# Patient Record
Sex: FEMALE | Race: WHITE | Employment: PART TIME | URBAN - METROPOLITAN AREA
[De-identification: names, ages, dates, MRNs, and addresses within clinical notes are randomized per-mention and may not be internally consistent; named-entity substitution may affect disease eponyms.]

---

## 2018-04-30 ENCOUNTER — OFFICE VISIT (OUTPATIENT)
Dept: ORTHOPEDIC SURGERY | Age: 48
End: 2018-04-30

## 2018-04-30 VITALS
WEIGHT: 250 LBS | HEIGHT: 62 IN | DIASTOLIC BLOOD PRESSURE: 96 MMHG | BODY MASS INDEX: 46.01 KG/M2 | SYSTOLIC BLOOD PRESSURE: 132 MMHG | HEART RATE: 99 BPM

## 2018-04-30 DIAGNOSIS — M79.605 LEFT LEG PAIN: Primary | ICD-10-CM

## 2018-04-30 DIAGNOSIS — M19.172 POST-TRAUMATIC ARTHRITIS OF ANKLE, LEFT: ICD-10-CM

## 2018-04-30 PROCEDURE — G8427 DOCREV CUR MEDS BY ELIG CLIN: HCPCS | Performed by: ORTHOPAEDIC SURGERY

## 2018-04-30 PROCEDURE — 1036F TOBACCO NON-USER: CPT | Performed by: ORTHOPAEDIC SURGERY

## 2018-04-30 PROCEDURE — 99214 OFFICE O/P EST MOD 30 MIN: CPT | Performed by: ORTHOPAEDIC SURGERY

## 2018-04-30 PROCEDURE — G8417 CALC BMI ABV UP PARAM F/U: HCPCS | Performed by: ORTHOPAEDIC SURGERY

## 2018-05-07 ENCOUNTER — HOSPITAL ENCOUNTER (OUTPATIENT)
Dept: MRI IMAGING | Age: 48
Discharge: HOME | End: 2018-05-07
Attending: ORTHOPAEDIC SURGERY | Admitting: ORTHOPAEDIC SURGERY

## 2018-05-07 DIAGNOSIS — M19.172 POST-TRAUMATIC OSTEOARTHRITIS, LEFT ANKLE AND FOOT: ICD-10-CM

## 2018-05-07 DIAGNOSIS — M19.172 POST-TRAUMATIC ARTHRITIS OF ANKLE, LEFT: ICD-10-CM

## 2018-05-10 ENCOUNTER — OFFICE VISIT (OUTPATIENT)
Dept: ORTHOPEDIC SURGERY | Age: 48
End: 2018-05-10

## 2018-05-10 VITALS
WEIGHT: 250 LBS | HEIGHT: 62 IN | BODY MASS INDEX: 46.01 KG/M2 | SYSTOLIC BLOOD PRESSURE: 143 MMHG | DIASTOLIC BLOOD PRESSURE: 90 MMHG | HEART RATE: 112 BPM

## 2018-05-10 DIAGNOSIS — M19.172 POST-TRAUMATIC ARTHRITIS OF ANKLE, LEFT: ICD-10-CM

## 2018-05-10 DIAGNOSIS — M19.072 ARTHRITIS OF LEFT ANKLE: Primary | ICD-10-CM

## 2018-05-10 DIAGNOSIS — M72.2 PLANTAR FASCIITIS OF LEFT FOOT: ICD-10-CM

## 2018-05-10 PROCEDURE — 99213 OFFICE O/P EST LOW 20 MIN: CPT | Performed by: PHYSICIAN ASSISTANT

## 2018-05-10 PROCEDURE — 1036F TOBACCO NON-USER: CPT | Performed by: PHYSICIAN ASSISTANT

## 2018-05-10 PROCEDURE — L1902 AFO ANKLE GAUNTLET PRE OTS: HCPCS | Performed by: PHYSICIAN ASSISTANT

## 2018-05-10 PROCEDURE — G8417 CALC BMI ABV UP PARAM F/U: HCPCS | Performed by: PHYSICIAN ASSISTANT

## 2018-05-10 PROCEDURE — G8427 DOCREV CUR MEDS BY ELIG CLIN: HCPCS | Performed by: PHYSICIAN ASSISTANT

## 2018-05-10 RX ORDER — DICLOFENAC SODIUM 75 MG/1
75 TABLET, DELAYED RELEASE ORAL 2 TIMES DAILY WITH MEALS
Qty: 40 TABLET | Refills: 0 | Status: SHIPPED | OUTPATIENT
Start: 2018-05-10 | End: 2018-06-01 | Stop reason: SDUPTHER

## 2018-06-01 ENCOUNTER — OFFICE VISIT (OUTPATIENT)
Dept: ORTHOPEDIC SURGERY | Age: 48
End: 2018-06-01

## 2018-06-01 VITALS
HEIGHT: 62 IN | HEART RATE: 72 BPM | WEIGHT: 250 LBS | DIASTOLIC BLOOD PRESSURE: 65 MMHG | BODY MASS INDEX: 46.01 KG/M2 | SYSTOLIC BLOOD PRESSURE: 96 MMHG

## 2018-06-01 DIAGNOSIS — M19.172 POST-TRAUMATIC ARTHRITIS OF LEFT ANKLE: Primary | ICD-10-CM

## 2018-06-01 PROCEDURE — 99214 OFFICE O/P EST MOD 30 MIN: CPT | Performed by: ORTHOPAEDIC SURGERY

## 2018-06-01 PROCEDURE — G8427 DOCREV CUR MEDS BY ELIG CLIN: HCPCS | Performed by: ORTHOPAEDIC SURGERY

## 2018-06-01 PROCEDURE — G8417 CALC BMI ABV UP PARAM F/U: HCPCS | Performed by: ORTHOPAEDIC SURGERY

## 2018-06-01 PROCEDURE — 1036F TOBACCO NON-USER: CPT | Performed by: ORTHOPAEDIC SURGERY

## 2018-06-01 RX ORDER — MELOXICAM 15 MG/1
15 TABLET ORAL DAILY
Qty: 30 TABLET | Refills: 0 | Status: SHIPPED | OUTPATIENT
Start: 2018-06-01 | End: 2018-06-27 | Stop reason: SDUPTHER

## 2018-06-01 RX ORDER — DICLOFENAC SODIUM 75 MG/1
TABLET, DELAYED RELEASE ORAL
Qty: 40 TABLET | Refills: 0 | Status: SHIPPED | OUTPATIENT
Start: 2018-06-01 | End: 2019-07-29

## 2018-06-27 DIAGNOSIS — M19.172 POST-TRAUMATIC ARTHRITIS OF LEFT ANKLE: ICD-10-CM

## 2018-06-27 RX ORDER — MELOXICAM 15 MG/1
TABLET ORAL
Qty: 30 TABLET | Refills: 5 | Status: SHIPPED | OUTPATIENT
Start: 2018-06-27 | End: 2019-07-29

## 2018-07-17 ENCOUNTER — OFFICE VISIT (OUTPATIENT)
Dept: ORTHOPEDIC SURGERY | Age: 48
End: 2018-07-17

## 2018-07-17 VITALS
HEIGHT: 61 IN | SYSTOLIC BLOOD PRESSURE: 134 MMHG | BODY MASS INDEX: 47.2 KG/M2 | WEIGHT: 250 LBS | DIASTOLIC BLOOD PRESSURE: 87 MMHG | HEART RATE: 90 BPM

## 2018-07-17 DIAGNOSIS — M25.572 LEFT ANKLE PAIN, UNSPECIFIED CHRONICITY: Primary | ICD-10-CM

## 2018-07-17 PROCEDURE — 20605 DRAIN/INJ JOINT/BURSA W/O US: CPT | Performed by: ORTHOPAEDIC SURGERY

## 2018-07-17 PROCEDURE — 99212 OFFICE O/P EST SF 10 MIN: CPT | Performed by: ORTHOPAEDIC SURGERY

## 2018-07-17 PROCEDURE — 1036F TOBACCO NON-USER: CPT | Performed by: ORTHOPAEDIC SURGERY

## 2018-07-17 PROCEDURE — G8427 DOCREV CUR MEDS BY ELIG CLIN: HCPCS | Performed by: ORTHOPAEDIC SURGERY

## 2018-07-17 PROCEDURE — G8417 CALC BMI ABV UP PARAM F/U: HCPCS | Performed by: ORTHOPAEDIC SURGERY

## 2018-07-23 ENCOUNTER — APPOINTMENT (OUTPATIENT)
Dept: GENERAL RADIOLOGY | Age: 48
End: 2018-07-23
Payer: MEDICARE

## 2018-07-23 ENCOUNTER — HOSPITAL ENCOUNTER (EMERGENCY)
Age: 48
Discharge: HOME | End: 2018-07-23
Attending: EMERGENCY MEDICINE
Payer: MEDICARE

## 2018-07-23 VITALS
HEART RATE: 100 BPM | HEIGHT: 62 IN | DIASTOLIC BLOOD PRESSURE: 107 MMHG | OXYGEN SATURATION: 97 % | WEIGHT: 280 LBS | SYSTOLIC BLOOD PRESSURE: 161 MMHG | BODY MASS INDEX: 51.53 KG/M2 | TEMPERATURE: 98.2 F | RESPIRATION RATE: 16 BRPM

## 2018-07-23 DIAGNOSIS — M79.601 RIGHT ARM PAIN: Primary | ICD-10-CM

## 2018-07-23 PROCEDURE — 99283 EMERGENCY DEPT VISIT LOW MDM: CPT

## 2018-07-23 PROCEDURE — 73060 X-RAY EXAM OF HUMERUS: CPT

## 2018-07-23 ASSESSMENT — PAIN SCALES - GENERAL: PAINLEVEL_OUTOF10: 2

## 2018-07-24 NOTE — ED PROVIDER NOTES
lungs every 6 hours as needed for Wheezing    BUSPIRONE (BUSPAR) 10 MG TABLET    Take 2 tablets by mouth 3 times daily. CALCIUM CARBONATE (OSCAL) 500 MG TABS TABLET    Take 500 mg by mouth daily. CLONAZEPAM (KLONOPIN) 0.5 MG TABLET    Take 1 tablet by mouth 3 times daily. q4-6 hours prn throughout the day    DICLOFENAC (VOLTAREN) 75 MG EC TABLET    TAKE 1 TABLET BY MOUTH TWICE A DAY WITH FOOD    FERROUS SULFATE 325 (65 FE) MG TABLET    Take 325 mg by mouth 2 times daily. FISH OIL-OMEGA-3 FATTY ACIDS 1000 MG CAPSULE    Take 1 g by mouth nightly. MELOXICAM (MOBIC) 15 MG TABLET    TAKE 1 TABLET BY MOUTH EVERY DAY    NAPROXEN (NAPROSYN) 375 MG TABLET    Take 1 tablet by mouth 2 times daily for 10 days    NIACIN 500 MG CR CAPSULE    Take 500 mg by mouth nightly. OMEPRAZOLE (PRILOSEC) 20 MG CAPSULE    Take 1 capsule by mouth daily. POTASSIUM CHLORIDE RAJANI CR (KLOR-CON M20 PO)    Take 1 tablet by mouth 2 times daily. TRIAMTERENE-HYDROCHLOROTHIAZIDE (MAXZIDE-25) 37.5-25 MG PER TABLET    Take 1 tablet by mouth daily. VENLAFAXINE (EFFEXOR) 100 MG TABLET    Take 4.5 tablets by mouth daily. ALLERGIES     Patient has no known allergies.     FAMILY HISTORY       Family History   Problem Relation Age of Onset    High Blood Pressure Mother     Heart Disease Father     High Blood Pressure Father           SOCIAL HISTORY       Social History     Social History    Marital status:      Spouse name: N/A    Number of children: N/A    Years of education: N/A     Social History Main Topics    Smoking status: Never Smoker    Smokeless tobacco: Never Used    Alcohol use No    Drug use: No    Sexual activity: Not Asked     Other Topics Concern    None     Social History Narrative    None       SCREENINGS             PHYSICAL EXAM    (up to 7 for level 4, 8 or more for level 5)     ED Triage Vitals [07/23/18 2153]   BP Temp Temp Source Pulse Resp SpO2 Height Weight   (!) 161/107 98.2 °F (36.8 °C) Oral 100 16 97 % 5' 2\" (1.575 m) 280 lb (127 kg)       Physical Exam      General Appearance:  Alert, cooperative, no distress, appears stated age. Head:  Normocephalic, without obvious abnormality, atraumatic. Eyes:  conjunctiva/corneas clear, EOM's intact. Sclera anicteric. ENT: Mucous membranes moist.   Neck: Supple, symmetrical, trachea midline, no adenopathy. No jugular venous distention. Lungs:   Clear to auscultation bilaterally, respirations unlabored. No rales, rhonchi or wheezes. Chest Wall:  No tenderness. Heart:  Regular rate and rhythm, S1 and S2 normal, no murmur, rub or gallop. Abdomen:   Soft, non-tender, bowel sounds active,   no masses, no organomegaly. Extremities: No bruising or ecchymosis to the right upper arm mild tenderness on palpation no crepitus no pain or tenderness in the apical before meals joint shoulder elbow forearm wrist and hand   Pulses: 2+ and symmetric   Skin: Turgor is normal, no rashes or lesions. Neurologic: Alert and oriented X 3. No focal findings. Motor grossly normal.  Speech clear, no drift, CN III-XII grossly intact,        DIAGNOSTIC RESULTS   LABS:    Labs Reviewed - No data to display    All other labs were within normal range or not returned as of this dictation. EKG: All EKG's are interpreted by the Emergency Department Physician who either signs or Co-signs this chart in the absence of a cardiologist.        RADIOLOGY:   Non-plain film images such as CT, Ultrasound and MRI are read by the radiologist. Plain radiographic images are visualized by myself. *    Interpretation per the Radiologist below, if available at the time of this note:    XR HUMERUS RIGHT (MIN 2 VIEWS)   Final Result   No fracture or malalignment.                PROCEDURES   Unless otherwise noted below, none     Procedures    *    CRITICAL CARE TIME   N/A      EMERGENCY DEPARTMENT COURSE and DIFFERENTIAL DIAGNOSIS/MDM:   Vitals:    Vitals: 07/23/18 2153   BP: (!) 161/107   Pulse: 100   Resp: 16   Temp: 98.2 °F (36.8 °C)   TempSrc: Oral   SpO2: 97%   Weight: 280 lb (127 kg)   Height: 5' 2\" (1.575 m)       Patient was given the following medications:  Medications - No data to display        The patient tolerated their visit well. The patient and / or the family were informed of the results of any tests, a time was given to answer questions. FINAL IMPRESSION      1. Right arm pain          DISPOSITION/PLAN   DISPOSITION Decision To Discharge 07/23/2018 10:44:05 PM      PATIENT REFERRED TO:  Dinesh Handy  [address] [address]  [phone]    In 2 days  As needed      DISCHARGE MEDICATIONS:  New Prescriptions    No medications on file       DISCONTINUED MEDICATIONS:  Discontinued Medications    No medications on file              (Please note that portions of this note were completed with a voice recognition program.  Efforts were made to edit the dictations but occasionally words are mis-transcribed.)    Jessica Rehman MD (electronically signed)      Jessica Rehman MD  07/23/18 [phone]

## 2019-01-17 ENCOUNTER — OFFICE VISIT (OUTPATIENT)
Dept: ORTHOPEDIC SURGERY | Age: 49
End: 2019-01-17
Payer: MEDICARE

## 2019-01-17 VITALS — HEIGHT: 62 IN | BODY MASS INDEX: 51.52 KG/M2 | WEIGHT: 279.98 LBS

## 2019-01-17 DIAGNOSIS — S83.241A TEAR OF MEDIAL MENISCUS OF RIGHT KNEE, UNSPECIFIED TEAR TYPE, UNSPECIFIED WHETHER OLD OR CURRENT TEAR, INITIAL ENCOUNTER: ICD-10-CM

## 2019-01-17 DIAGNOSIS — M17.11 PATELLOFEMORAL ARTHRITIS OF RIGHT KNEE: ICD-10-CM

## 2019-01-17 DIAGNOSIS — M25.561 RIGHT KNEE PAIN, UNSPECIFIED CHRONICITY: Primary | ICD-10-CM

## 2019-01-17 PROCEDURE — 99213 OFFICE O/P EST LOW 20 MIN: CPT | Performed by: ORTHOPAEDIC SURGERY

## 2019-01-17 PROCEDURE — 20610 DRAIN/INJ JOINT/BURSA W/O US: CPT | Performed by: ORTHOPAEDIC SURGERY

## 2019-02-11 ENCOUNTER — OFFICE VISIT (OUTPATIENT)
Dept: ORTHOPEDIC SURGERY | Age: 49
End: 2019-02-11
Payer: MEDICARE

## 2019-02-11 VITALS — BODY MASS INDEX: 51.52 KG/M2 | HEIGHT: 62 IN | WEIGHT: 279.98 LBS

## 2019-02-11 DIAGNOSIS — M25.561 RIGHT KNEE PAIN, UNSPECIFIED CHRONICITY: Primary | ICD-10-CM

## 2019-02-11 DIAGNOSIS — S83.241A TEAR OF MEDIAL MENISCUS OF RIGHT KNEE, UNSPECIFIED TEAR TYPE, UNSPECIFIED WHETHER OLD OR CURRENT TEAR, INITIAL ENCOUNTER: ICD-10-CM

## 2019-02-11 DIAGNOSIS — M17.11 PATELLOFEMORAL ARTHRITIS OF RIGHT KNEE: ICD-10-CM

## 2019-02-11 PROCEDURE — G8484 FLU IMMUNIZE NO ADMIN: HCPCS | Performed by: ORTHOPAEDIC SURGERY

## 2019-02-11 PROCEDURE — 1036F TOBACCO NON-USER: CPT | Performed by: ORTHOPAEDIC SURGERY

## 2019-02-11 PROCEDURE — 99214 OFFICE O/P EST MOD 30 MIN: CPT | Performed by: ORTHOPAEDIC SURGERY

## 2019-02-11 PROCEDURE — G8427 DOCREV CUR MEDS BY ELIG CLIN: HCPCS | Performed by: ORTHOPAEDIC SURGERY

## 2019-02-11 PROCEDURE — G8417 CALC BMI ABV UP PARAM F/U: HCPCS | Performed by: ORTHOPAEDIC SURGERY

## 2019-02-18 ENCOUNTER — OFFICE VISIT (OUTPATIENT)
Dept: ORTHOPEDIC SURGERY | Age: 49
End: 2019-02-18
Payer: MEDICARE

## 2019-02-18 DIAGNOSIS — M17.11 PRIMARY OSTEOARTHRITIS OF RIGHT KNEE: Primary | ICD-10-CM

## 2019-02-18 PROCEDURE — 99999 PR OFFICE/OUTPT VISIT,PROCEDURE ONLY: CPT | Performed by: PHYSICIAN ASSISTANT

## 2019-02-18 PROCEDURE — 20611 DRAIN/INJ JOINT/BURSA W/US: CPT | Performed by: PHYSICIAN ASSISTANT

## 2019-02-25 ENCOUNTER — OFFICE VISIT (OUTPATIENT)
Dept: ORTHOPEDIC SURGERY | Age: 49
End: 2019-02-25
Payer: MEDICARE

## 2019-02-25 DIAGNOSIS — M17.11 PRIMARY OSTEOARTHRITIS OF RIGHT KNEE: Primary | ICD-10-CM

## 2019-03-04 ENCOUNTER — OFFICE VISIT (OUTPATIENT)
Dept: ORTHOPEDIC SURGERY | Age: 49
End: 2019-03-04
Payer: MEDICARE

## 2019-03-04 DIAGNOSIS — M17.11 PRIMARY OSTEOARTHRITIS OF RIGHT KNEE: Primary | ICD-10-CM

## 2019-07-29 ENCOUNTER — HOSPITAL ENCOUNTER (EMERGENCY)
Age: 49
Discharge: HOME | End: 2019-07-29
Payer: MEDICARE

## 2019-07-29 VITALS
WEIGHT: 275 LBS | TEMPERATURE: 98.7 F | OXYGEN SATURATION: 98 % | SYSTOLIC BLOOD PRESSURE: 125 MMHG | DIASTOLIC BLOOD PRESSURE: 98 MMHG | HEART RATE: 78 BPM | RESPIRATION RATE: 17 BRPM | HEIGHT: 62 IN | BODY MASS INDEX: 50.61 KG/M2

## 2019-07-29 DIAGNOSIS — F43.9 STRESS: ICD-10-CM

## 2019-07-29 DIAGNOSIS — Z86.59 HISTORY OF PANIC ATTACKS: Primary | ICD-10-CM

## 2019-07-29 PROCEDURE — 6370000000 HC RX 637 (ALT 250 FOR IP): Performed by: NURSE PRACTITIONER

## 2019-07-29 PROCEDURE — 99282 EMERGENCY DEPT VISIT SF MDM: CPT

## 2019-07-29 RX ORDER — ACETAMINOPHEN 325 MG/1
650 TABLET ORAL ONCE
Status: COMPLETED | OUTPATIENT
Start: 2019-07-29 | End: 2019-07-29

## 2019-07-29 RX ADMIN — ACETAMINOPHEN 650 MG: 325 TABLET ORAL at 13:41

## 2019-07-29 ASSESSMENT — PAIN DESCRIPTION - LOCATION: LOCATION: HEAD

## 2019-07-29 ASSESSMENT — PAIN DESCRIPTION - DESCRIPTORS: DESCRIPTORS: ACHING

## 2019-07-29 ASSESSMENT — ENCOUNTER SYMPTOMS
ABDOMINAL PAIN: 0
SHORTNESS OF BREATH: 0

## 2019-07-29 ASSESSMENT — PAIN SCALES - GENERAL
PAINLEVEL_OUTOF10: 5
PAINLEVEL_OUTOF10: 5

## 2019-07-29 ASSESSMENT — PAIN DESCRIPTION - PAIN TYPE: TYPE: ACUTE PAIN

## 2019-07-29 NOTE — ED PROVIDER NOTES
Evaluated by [id number] [facility] Provider          [facility] ED  eMERGENCY dEPARTMENT eNCOUnter        Pt Name: Amber Arias  MRN: [id number]  [place] 1970  Dateof evaluation: 7/29/2019  Provider: ROBER Abreu CNP  PCP: Yoana Barragan  ED Attending: No att. providers found    [facility]       Chief Complaint   Patient presents with    Panic Attack     pt states she was at work today and had a panic attack. pt states she had a situation that made her very upset and she started crying and had rapid breathing. pt c/o headache. pt calm and breathing easy at this time. HISTORY OF PRESENTILLNESS   (Location/Symptom, Timing/Onset, Context/Setting, Quality, Duration, Modifying Factors, Severity)  Note limiting factors. Amber Arias is a 47 y.o. female for concern for a panic attack. Onset was today. Duration has been since the onset. Context includes pt states she had a panic attack this am and now has a headache. Alleviating factors include nothing. Aggravating factors include nothing. Pain is 5/10. nothing has been used for pain today. Nursing Notes were all reviewed and agreed with or any disagreements were addressed  in the HPI. REVIEW OF SYSTEMS    (2-9 systems for level 4, 10 or more for level 5)     Review of Systems   Constitutional: Negative for fever. Respiratory: Negative for shortness of breath. Cardiovascular: Negative for chest pain. Gastrointestinal: Negative for abdominal pain. Genitourinary: Negative for difficulty urinating. Neurological: Positive for headaches. All other systems reviewed and are negative. Positives and Pertinent negatives as per HPI. Except as noted above in the ROS, all other systems were reviewed and negative.        PAST MEDICAL HISTORY     Past Medical History:   Diagnosis Date    Anxiety     severe    GERD (gastroesophageal reflux disease)     Hiatal hernia     Hypertension     Nausea & vomiting

## 2019-09-30 ENCOUNTER — APPOINTMENT (OUTPATIENT)
Dept: GENERAL RADIOLOGY | Age: 49
End: 2019-09-30
Payer: MEDICARE

## 2019-09-30 ENCOUNTER — HOSPITAL ENCOUNTER (EMERGENCY)
Age: 49
Discharge: HOME | End: 2019-09-30
Attending: EMERGENCY MEDICINE
Payer: MEDICARE

## 2019-09-30 ENCOUNTER — APPOINTMENT (OUTPATIENT)
Dept: CT IMAGING | Age: 49
End: 2019-09-30
Payer: MEDICARE

## 2019-09-30 VITALS
TEMPERATURE: 98 F | DIASTOLIC BLOOD PRESSURE: 100 MMHG | RESPIRATION RATE: 16 BRPM | OXYGEN SATURATION: 93 % | HEART RATE: 92 BPM | SYSTOLIC BLOOD PRESSURE: 144 MMHG | HEIGHT: 68 IN | BODY MASS INDEX: 42.44 KG/M2 | WEIGHT: 280 LBS
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
## 2019-09-30 LAB
A/G RATIO: 1 (ref 1.1–2.2)
ALBUMIN SERPL-MCNC: 3.7 G/DL (ref 3.4–5)
ALP BLD-CCNC: 82 U/L (ref 40–129)
ALT SERPL-CCNC: 7 U/L (ref 10–40)
ANION GAP SERPL CALCULATED.3IONS-SCNC: 11 MMOL/L (ref 3–16)
AST SERPL-CCNC: 11 U/L (ref 15–37)
BASOPHILS ABSOLUTE: 0.1 K/UL (ref 0–0.2)
BASOPHILS RELATIVE PERCENT: 0.7 %
BILIRUB SERPL-MCNC: 0.3 MG/DL (ref 0–1)
BUN BLDV-MCNC: 19 MG/DL (ref 7–20)
CALCIUM SERPL-MCNC: 9.4 MG/DL (ref 8.3–10.6)
CHLORIDE BLD-SCNC: 101 MMOL/L (ref 99–110)
CO2: 26 MMOL/L (ref 21–32)
CREAT SERPL-MCNC: 1.2 MG/DL (ref 0.6–1.1)
EOSINOPHILS ABSOLUTE: 0.2 K/UL (ref 0–0.6)
EOSINOPHILS RELATIVE PERCENT: 2.7 %
GFR AFRICAN AMERICAN: 57
GFR NON-AFRICAN AMERICAN: 47
GLOBULIN: 3.6 G/DL
GLUCOSE BLD-MCNC: 99 MG/DL (ref 70–99)
HCG QUALITATIVE: NEGATIVE
HCT VFR BLD CALC: 43 % (ref 36–48)
HEMOGLOBIN: 14.1 G/DL (ref 12–16)
LYMPHOCYTES ABSOLUTE: 1.4 K/UL (ref 1–5.1)
LYMPHOCYTES RELATIVE PERCENT: 17.3 %
MCH RBC QN AUTO: 28.6 PG (ref 26–34)
MCHC RBC AUTO-ENTMCNC: 32.7 G/DL (ref 31–36)
MCV RBC AUTO: 87.4 FL (ref 80–100)
MONOCYTES ABSOLUTE: 0.8 K/UL (ref 0–1.3)
MONOCYTES RELATIVE PERCENT: 9.6 %
NEUTROPHILS ABSOLUTE: 5.7 K/UL (ref 1.7–7.7)
NEUTROPHILS RELATIVE PERCENT: 69.7 %
PDW BLD-RTO: 13.8 % (ref 12.4–15.4)
PLATELET # BLD: 230 K/UL (ref 135–450)
PMV BLD AUTO: 7.3 FL (ref 5–10.5)
POTASSIUM REFLEX MAGNESIUM: 4.1 MMOL/L (ref 3.5–5.1)
RBC # BLD: 4.92 M/UL (ref 4–5.2)
SODIUM BLD-SCNC: 138 MMOL/L (ref 136–145)
TOTAL PROTEIN: 7.3 G/DL (ref 6.4–8.2)
WBC # BLD: 8.2 K/UL (ref 4–11)

## 2019-09-30 PROCEDURE — 6360000002 HC RX W HCPCS: Performed by: EMERGENCY MEDICINE

## 2019-09-30 PROCEDURE — 73562 X-RAY EXAM OF KNEE 3: CPT

## 2019-09-30 PROCEDURE — 84703 CHORIONIC GONADOTROPIN ASSAY: CPT

## 2019-09-30 PROCEDURE — 85025 COMPLETE CBC W/AUTO DIFF WBC: CPT

## 2019-09-30 PROCEDURE — 90471 IMMUNIZATION ADMIN: CPT | Performed by: EMERGENCY MEDICINE

## 2019-09-30 PROCEDURE — 70450 CT HEAD/BRAIN W/O DYE: CPT

## 2019-09-30 PROCEDURE — 73590 X-RAY EXAM OF LOWER LEG: CPT

## 2019-09-30 PROCEDURE — 80053 COMPREHEN METABOLIC PANEL: CPT

## 2019-09-30 PROCEDURE — 99284 EMERGENCY DEPT VISIT MOD MDM: CPT

## 2019-09-30 PROCEDURE — 90715 TDAP VACCINE 7 YRS/> IM: CPT | Performed by: EMERGENCY MEDICINE

## 2019-09-30 RX ADMIN — TETANUS TOXOID, REDUCED DIPHTHERIA TOXOID AND ACELLULAR PERTUSSIS VACCINE, ADSORBED 0.5 ML: 5; 2.5; 8; 8; 2.5 SUSPENSION INTRAMUSCULAR at 13:04

## 2019-09-30 ASSESSMENT — PAIN DESCRIPTION - LOCATION: LOCATION: HEAD

## 2019-09-30 ASSESSMENT — PAIN DESCRIPTION - PAIN TYPE: TYPE: ACUTE PAIN

## 2019-09-30 ASSESSMENT — PAIN SCALES - GENERAL: PAINLEVEL_OUTOF10: 4

## 2019-10-02 ENCOUNTER — HOSPITAL ENCOUNTER (EMERGENCY)
Age: 49
Discharge: HOME | End: 2019-10-02
Attending: EMERGENCY MEDICINE
Payer: MEDICARE

## 2019-10-02 VITALS
OXYGEN SATURATION: 97 % | TEMPERATURE: 97.6 F | WEIGHT: 280 LBS | HEART RATE: 94 BPM | SYSTOLIC BLOOD PRESSURE: 141 MMHG | BODY MASS INDEX: 42.44 KG/M2 | HEIGHT: 68 IN | DIASTOLIC BLOOD PRESSURE: 104 MMHG | RESPIRATION RATE: 16 BRPM

## 2019-10-02 DIAGNOSIS — R45.851 SUICIDAL THOUGHTS: ICD-10-CM

## 2019-10-02 DIAGNOSIS — Z86.59 HX OF ANXIETY DISORDER: ICD-10-CM

## 2019-10-02 DIAGNOSIS — I10 HYPERTENSION, UNSPECIFIED TYPE: ICD-10-CM

## 2019-10-02 DIAGNOSIS — Z00.8 ENCOUNTER FOR PSYCHOLOGICAL EVALUATION: Primary | ICD-10-CM

## 2019-10-02 LAB
A/G RATIO: 1.1 (ref 1.1–2.2)
ACETAMINOPHEN LEVEL: <5 UG/ML (ref 10–30)
ALBUMIN SERPL-MCNC: 4.3 G/DL (ref 3.4–5)
ALP BLD-CCNC: 93 U/L (ref 40–129)
ALT SERPL-CCNC: <5 U/L (ref 10–40)
AMPHETAMINE SCREEN, URINE: POSITIVE
ANION GAP SERPL CALCULATED.3IONS-SCNC: 13 MMOL/L (ref 3–16)
AST SERPL-CCNC: 12 U/L (ref 15–37)
BARBITURATE SCREEN URINE: ABNORMAL
BASOPHILS ABSOLUTE: 0.1 K/UL (ref 0–0.2)
BASOPHILS RELATIVE PERCENT: 0.8 %
BENZODIAZEPINE SCREEN, URINE: ABNORMAL
BILIRUB SERPL-MCNC: 0.3 MG/DL (ref 0–1)
BILIRUBIN URINE: NEGATIVE
BLOOD, URINE: ABNORMAL
BUN BLDV-MCNC: 22 MG/DL (ref 7–20)
CALCIUM SERPL-MCNC: 9.7 MG/DL (ref 8.3–10.6)
CANNABINOID SCREEN URINE: ABNORMAL
CHLORIDE BLD-SCNC: 100 MMOL/L (ref 99–110)
CLARITY: ABNORMAL
CO2: 24 MMOL/L (ref 21–32)
COCAINE METABOLITE SCREEN URINE: ABNORMAL
COLOR: YELLOW
CREAT SERPL-MCNC: 1.3 MG/DL (ref 0.6–1.1)
EOSINOPHILS ABSOLUTE: 0.3 K/UL (ref 0–0.6)
EOSINOPHILS RELATIVE PERCENT: 3 %
EPITHELIAL CELLS, UA: ABNORMAL /HPF
ETHANOL: 36 MG/DL (ref 0–0.08)
GFR AFRICAN AMERICAN: 52
GFR NON-AFRICAN AMERICAN: 43
GLOBULIN: 3.8 G/DL
GLUCOSE BLD-MCNC: 114 MG/DL (ref 70–99)
GLUCOSE URINE: NEGATIVE MG/DL
HCT VFR BLD CALC: 47.1 % (ref 36–48)
HEMOGLOBIN: 15.6 G/DL (ref 12–16)
KETONES, URINE: NEGATIVE MG/DL
LEUKOCYTE ESTERASE, URINE: NEGATIVE
LYMPHOCYTES ABSOLUTE: 1.9 K/UL (ref 1–5.1)
LYMPHOCYTES RELATIVE PERCENT: 18.3 %
Lab: ABNORMAL
MCH RBC QN AUTO: 28.7 PG (ref 26–34)
MCHC RBC AUTO-ENTMCNC: 33.1 G/DL (ref 31–36)
MCV RBC AUTO: 86.8 FL (ref 80–100)
METHADONE SCREEN, URINE: ABNORMAL
MICROSCOPIC EXAMINATION: YES
MONOCYTES ABSOLUTE: 0.8 K/UL (ref 0–1.3)
MONOCYTES RELATIVE PERCENT: 7.9 %
NEUTROPHILS ABSOLUTE: 7.3 K/UL (ref 1.7–7.7)
NEUTROPHILS RELATIVE PERCENT: 70 %
NITRITE, URINE: NEGATIVE
OPIATE SCREEN URINE: ABNORMAL
OXYCODONE URINE: ABNORMAL
PDW BLD-RTO: 13.9 % (ref 12.4–15.4)
PH UA: 5.5
PH UA: 5.5 (ref 5–8)
PHENCYCLIDINE SCREEN URINE: POSITIVE
PLATELET # BLD: 264 K/UL (ref 135–450)
PMV BLD AUTO: 7.2 FL (ref 5–10.5)
POTASSIUM SERPL-SCNC: 3.8 MMOL/L (ref 3.5–5.1)
PROPOXYPHENE SCREEN: ABNORMAL
PROTEIN UA: NEGATIVE MG/DL
RBC # BLD: 5.42 M/UL (ref 4–5.2)
RBC UA: ABNORMAL /HPF (ref 0–2)
SALICYLATE, SERUM: <0.3 MG/DL (ref 15–30)
SODIUM BLD-SCNC: 137 MMOL/L (ref 136–145)
SPECIFIC GRAVITY UA: >=1.03 (ref 1–1.03)
TOTAL PROTEIN: 8.1 G/DL (ref 6.4–8.2)
URINE REFLEX TO CULTURE: ABNORMAL
URINE TYPE: ABNORMAL
UROBILINOGEN, URINE: 0.2 E.U./DL
WBC # BLD: 10.5 K/UL (ref 4–11)
WBC UA: ABNORMAL /HPF (ref 0–5)

## 2019-10-02 PROCEDURE — G0480 DRUG TEST DEF 1-7 CLASSES: HCPCS

## 2019-10-02 PROCEDURE — 80053 COMPREHEN METABOLIC PANEL: CPT

## 2019-10-02 PROCEDURE — 81001 URINALYSIS AUTO W/SCOPE: CPT

## 2019-10-02 PROCEDURE — 85025 COMPLETE CBC W/AUTO DIFF WBC: CPT

## 2019-10-02 PROCEDURE — 99284 EMERGENCY DEPT VISIT MOD MDM: CPT

## 2019-10-02 PROCEDURE — 80307 DRUG TEST PRSMV CHEM ANLYZR: CPT

## 2019-11-10 ENCOUNTER — HOSPITAL ENCOUNTER (EMERGENCY)
Age: 49
Discharge: HOME | End: 2019-11-10
Attending: EMERGENCY MEDICINE
Payer: MEDICARE

## 2019-11-10 VITALS
HEART RATE: 98 BPM | TEMPERATURE: 98.3 F | OXYGEN SATURATION: 95 % | SYSTOLIC BLOOD PRESSURE: 178 MMHG | BODY MASS INDEX: 46.01 KG/M2 | RESPIRATION RATE: 18 BRPM | HEIGHT: 62 IN | DIASTOLIC BLOOD PRESSURE: 112 MMHG | WEIGHT: 250 LBS

## 2019-11-10 DIAGNOSIS — F19.10 POLYSUBSTANCE ABUSE (HCC): ICD-10-CM

## 2019-11-10 DIAGNOSIS — S40.022A TRAUMATIC ECCHYMOSIS OF LEFT UPPER ARM, INITIAL ENCOUNTER: ICD-10-CM

## 2019-11-10 DIAGNOSIS — Z72.89 DELIBERATE SELF-CUTTING: ICD-10-CM

## 2019-11-10 DIAGNOSIS — R03.0 ELEVATED BLOOD PRESSURE READING: ICD-10-CM

## 2019-11-10 DIAGNOSIS — F39 MOOD DISORDER (HCC): Primary | ICD-10-CM

## 2019-11-10 LAB
A/G RATIO: 1 (ref 1.1–2.2)
ACETAMINOPHEN LEVEL: <5 UG/ML (ref 10–30)
ALBUMIN SERPL-MCNC: 4 G/DL (ref 3.4–5)
ALP BLD-CCNC: 88 U/L (ref 40–129)
ALT SERPL-CCNC: 11 U/L (ref 10–40)
AMPHETAMINE SCREEN, URINE: POSITIVE
ANION GAP SERPL CALCULATED.3IONS-SCNC: 11 MMOL/L (ref 3–16)
AST SERPL-CCNC: 12 U/L (ref 15–37)
BARBITURATE SCREEN URINE: ABNORMAL
BASOPHILS ABSOLUTE: 0.1 K/UL (ref 0–0.2)
BASOPHILS RELATIVE PERCENT: 0.6 %
BENZODIAZEPINE SCREEN, URINE: POSITIVE
BILIRUB SERPL-MCNC: 0.3 MG/DL (ref 0–1)
BILIRUBIN URINE: NEGATIVE
BLOOD, URINE: NEGATIVE
BUN BLDV-MCNC: 16 MG/DL (ref 7–20)
CALCIUM SERPL-MCNC: 10 MG/DL (ref 8.3–10.6)
CANNABINOID SCREEN URINE: ABNORMAL
CHLORIDE BLD-SCNC: 97 MMOL/L (ref 99–110)
CLARITY: CLEAR
CO2: 28 MMOL/L (ref 21–32)
COCAINE METABOLITE SCREEN URINE: ABNORMAL
COLOR: YELLOW
CREAT SERPL-MCNC: 1.1 MG/DL (ref 0.6–1.1)
EOSINOPHILS ABSOLUTE: 0.3 K/UL (ref 0–0.6)
EOSINOPHILS RELATIVE PERCENT: 2.3 %
GFR AFRICAN AMERICAN: >60
GFR NON-AFRICAN AMERICAN: 52
GLOBULIN: 3.9 G/DL
GLUCOSE BLD-MCNC: 121 MG/DL (ref 70–99)
GLUCOSE URINE: NEGATIVE MG/DL
HCT VFR BLD CALC: 44.1 % (ref 36–48)
HEMOGLOBIN: 14.4 G/DL (ref 12–16)
KETONES, URINE: NEGATIVE MG/DL
LEUKOCYTE ESTERASE, URINE: NEGATIVE
LYMPHOCYTES ABSOLUTE: 2 K/UL (ref 1–5.1)
LYMPHOCYTES RELATIVE PERCENT: 17.9 %
Lab: ABNORMAL
MCH RBC QN AUTO: 28.5 PG (ref 26–34)
MCHC RBC AUTO-ENTMCNC: 32.6 G/DL (ref 31–36)
MCV RBC AUTO: 87.5 FL (ref 80–100)
METHADONE SCREEN, URINE: ABNORMAL
MICROSCOPIC EXAMINATION: NORMAL
MONOCYTES ABSOLUTE: 0.9 K/UL (ref 0–1.3)
MONOCYTES RELATIVE PERCENT: 8.2 %
NEUTROPHILS ABSOLUTE: 8.1 K/UL (ref 1.7–7.7)
NEUTROPHILS RELATIVE PERCENT: 71 %
NITRITE, URINE: NEGATIVE
OPIATE SCREEN URINE: ABNORMAL
OXYCODONE URINE: ABNORMAL
PDW BLD-RTO: 13.9 % (ref 12.4–15.4)
PH UA: 6
PH UA: 6 (ref 5–8)
PHENCYCLIDINE SCREEN URINE: POSITIVE
PLATELET # BLD: 265 K/UL (ref 135–450)
PMV BLD AUTO: 7.1 FL (ref 5–10.5)
POTASSIUM SERPL-SCNC: 4.1 MMOL/L (ref 3.5–5.1)
PROPOXYPHENE SCREEN: ABNORMAL
PROTEIN UA: NEGATIVE MG/DL
RBC # BLD: 5.04 M/UL (ref 4–5.2)
SALICYLATE, SERUM: <0.3 MG/DL (ref 15–30)
SODIUM BLD-SCNC: 136 MMOL/L (ref 136–145)
SPECIFIC GRAVITY UA: 1.02 (ref 1–1.03)
TOTAL PROTEIN: 7.9 G/DL (ref 6.4–8.2)
URINE REFLEX TO CULTURE: NORMAL
URINE TYPE: NORMAL
UROBILINOGEN, URINE: 0.2 E.U./DL
WBC # BLD: 11.4 K/UL (ref 4–11)

## 2019-11-10 PROCEDURE — 80307 DRUG TEST PRSMV CHEM ANLYZR: CPT

## 2019-11-10 PROCEDURE — 80053 COMPREHEN METABOLIC PANEL: CPT

## 2019-11-10 PROCEDURE — 85025 COMPLETE CBC W/AUTO DIFF WBC: CPT

## 2019-11-10 PROCEDURE — G0480 DRUG TEST DEF 1-7 CLASSES: HCPCS

## 2019-11-10 PROCEDURE — 81003 URINALYSIS AUTO W/O SCOPE: CPT

## 2019-11-10 PROCEDURE — 99284 EMERGENCY DEPT VISIT MOD MDM: CPT

## 2020-03-08 ENCOUNTER — HOSPITAL ENCOUNTER (EMERGENCY)
Age: 50
Discharge: HOME | End: 2020-03-09
Attending: EMERGENCY MEDICINE
Payer: MEDICARE

## 2020-03-08 PROCEDURE — 99284 EMERGENCY DEPT VISIT MOD MDM: CPT

## 2020-03-08 ASSESSMENT — PAIN DESCRIPTION - LOCATION: LOCATION: ABDOMEN

## 2020-03-09 VITALS
TEMPERATURE: 97.1 F | DIASTOLIC BLOOD PRESSURE: 107 MMHG | OXYGEN SATURATION: 98 % | RESPIRATION RATE: 18 BRPM | SYSTOLIC BLOOD PRESSURE: 148 MMHG | HEART RATE: 89 BPM | BODY MASS INDEX: 52.44 KG/M2 | WEIGHT: 285 LBS | HEIGHT: 62 IN
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
## 2020-03-11 ASSESSMENT — ENCOUNTER SYMPTOMS
ABDOMINAL PAIN: 0
VOMITING: 0
NAUSEA: 0
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
## 2021-09-15 ENCOUNTER — HOSPITAL ENCOUNTER (EMERGENCY)
Age: 51
Discharge: HOME | End: 2021-09-15
Attending: EMERGENCY MEDICINE
Payer: MEDICARE

## 2021-09-15 VITALS
HEART RATE: 89 BPM | SYSTOLIC BLOOD PRESSURE: 141 MMHG | TEMPERATURE: 98.1 F | RESPIRATION RATE: 18 BRPM | OXYGEN SATURATION: 96 % | DIASTOLIC BLOOD PRESSURE: 87 MMHG

## 2021-09-15 DIAGNOSIS — U07.1 COVID-19: ICD-10-CM

## 2021-09-15 DIAGNOSIS — F41.9 ANXIETY: ICD-10-CM

## 2021-09-15 DIAGNOSIS — R11.2 NON-INTRACTABLE VOMITING WITH NAUSEA, UNSPECIFIED VOMITING TYPE: Primary | ICD-10-CM

## 2021-09-15 LAB
ANION GAP SERPL CALCULATED.3IONS-SCNC: 13 MMOL/L (ref 3–16)
BASOPHILS ABSOLUTE: 0 K/UL (ref 0–0.2)
BASOPHILS RELATIVE PERCENT: 0.4 %
BILIRUBIN URINE: ABNORMAL
BLOOD, URINE: NEGATIVE
BUN BLDV-MCNC: 15 MG/DL (ref 7–20)
CALCIUM SERPL-MCNC: 8.6 MG/DL (ref 8.3–10.6)
CHLORIDE BLD-SCNC: 103 MMOL/L (ref 99–110)
CLARITY: CLEAR
CO2: 23 MMOL/L (ref 21–32)
COLOR: YELLOW
CREAT SERPL-MCNC: 1 MG/DL (ref 0.6–1.1)
EOSINOPHILS ABSOLUTE: 0.1 K/UL (ref 0–0.6)
EOSINOPHILS RELATIVE PERCENT: 1.6 %
GFR AFRICAN AMERICAN: >60
GFR NON-AFRICAN AMERICAN: 58
GLUCOSE BLD-MCNC: 107 MG/DL (ref 70–99)
GLUCOSE URINE: NEGATIVE MG/DL
HCT VFR BLD CALC: 44.2 % (ref 36–48)
HEMOGLOBIN: 14.9 G/DL (ref 12–16)
KETONES, URINE: NEGATIVE MG/DL
LEUKOCYTE ESTERASE, URINE: NEGATIVE
LYMPHOCYTES ABSOLUTE: 0.9 K/UL (ref 1–5.1)
LYMPHOCYTES RELATIVE PERCENT: 19.4 %
MAGNESIUM: 1.8 MG/DL (ref 1.8–2.4)
MCH RBC QN AUTO: 28.7 PG (ref 26–34)
MCHC RBC AUTO-ENTMCNC: 33.7 G/DL (ref 31–36)
MCV RBC AUTO: 85.1 FL (ref 80–100)
MICROSCOPIC EXAMINATION: ABNORMAL
MONOCYTES ABSOLUTE: 0.4 K/UL (ref 0–1.3)
MONOCYTES RELATIVE PERCENT: 9.8 %
NEUTROPHILS ABSOLUTE: 3.1 K/UL (ref 1.7–7.7)
NEUTROPHILS RELATIVE PERCENT: 68.8 %
NITRITE, URINE: NEGATIVE
PDW BLD-RTO: 14.5 % (ref 12.4–15.4)
PH UA: 6 (ref 5–8)
PLATELET # BLD: 133 K/UL (ref 135–450)
PMV BLD AUTO: 7.8 FL (ref 5–10.5)
POTASSIUM REFLEX MAGNESIUM: 3.4 MMOL/L (ref 3.5–5.1)
PROTEIN UA: NEGATIVE MG/DL
RBC # BLD: 5.2 M/UL (ref 4–5.2)
SODIUM BLD-SCNC: 139 MMOL/L (ref 136–145)
SPECIFIC GRAVITY UA: >=1.03 (ref 1–1.03)
URINE TYPE: ABNORMAL
UROBILINOGEN, URINE: 0.2 E.U./DL
WBC # BLD: 4.5 K/UL (ref 4–11)

## 2021-09-15 PROCEDURE — 85025 COMPLETE CBC W/AUTO DIFF WBC: CPT

## 2021-09-15 PROCEDURE — 83735 ASSAY OF MAGNESIUM: CPT

## 2021-09-15 PROCEDURE — 6370000000 HC RX 637 (ALT 250 FOR IP): Performed by: EMERGENCY MEDICINE

## 2021-09-15 PROCEDURE — 99285 EMERGENCY DEPT VISIT HI MDM: CPT

## 2021-09-15 PROCEDURE — 81003 URINALYSIS AUTO W/O SCOPE: CPT

## 2021-09-15 PROCEDURE — 80048 BASIC METABOLIC PNL TOTAL CA: CPT

## 2021-09-15 RX ORDER — PROMETHAZINE HYDROCHLORIDE 25 MG/1
25 TABLET ORAL ONCE
Status: COMPLETED | OUTPATIENT
Start: 2021-09-15 | End: 2021-09-15

## 2021-09-15 RX ORDER — CLONAZEPAM 0.5 MG/1
0.5 TABLET ORAL ONCE
Status: COMPLETED | OUTPATIENT
Start: 2021-09-15 | End: 2021-09-15

## 2021-09-15 RX ORDER — ONDANSETRON 4 MG/1
4 TABLET, ORALLY DISINTEGRATING ORAL EVERY 8 HOURS PRN
Qty: 20 TABLET | Refills: 0 | Status: SHIPPED | OUTPATIENT
Start: 2021-09-15 | End: 2021-09-22

## 2021-09-15 RX ADMIN — PROMETHAZINE HYDROCHLORIDE 25 MG: 25 TABLET ORAL at 11:55

## 2021-09-15 RX ADMIN — CLONAZEPAM 0.5 MG: 0.5 TABLET ORAL at 11:55

## 2021-09-15 ASSESSMENT — PAIN SCALES - GENERAL: PAINLEVEL_OUTOF10: 2

## 2021-09-15 NOTE — ED NOTES
Sister called and said if anyone needs family to please call her due to parents going to doctor appt.    Sister - Nikki Wheeler [phone] (is on emergency contact)     Maria Elena Alert  09/15/21 8784

## 2021-09-15 NOTE — ED PROVIDER NOTES
History Narrative    Not on file     Social Determinants of Health     Financial Resource Strain:     Difficulty of Paying Living Expenses:    Food Insecurity:     Worried About Running Out of Food in the Last Year:     [address] in the Last Year:    Transportation Needs:     Lack of Transportation (Medical):  Lack of Transportation (Non-Medical):    Physical Activity:     Days of Exercise per Week:     Minutes of Exercise per Session:    Stress:     Feeling of Stress :    Social Connections:     Frequency of Communication with Friends and Family:     Frequency of Social Gatherings with Friends and Family:     Attends [employer] Services:     Active Member of Clubs or Organizations:     Attends Club or Organization Meetings:     Marital Status:    Intimate Partner Violence:     Fear of Current or Ex-Partner:     Emotionally Abused:     Physically Abused:     Sexually Abused:      No current facility-administered medications for this encounter. Current Outpatient Medications   Medication Sig Dispense Refill    ondansetron (ZOFRAN-ODT) 4 MG disintegrating tablet Place 1 tablet under the tongue every 8 hours as needed for Nausea or Vomiting 20 tablet 0    raNITIdine HCl (RANITIDINE 75 PO) Take by mouth      busPIRone (BUSPAR) 10 MG tablet Take 2 tablets by mouth 3 times daily. (Patient taking differently: Take 15 mg by mouth 3 times daily.) 30 tablet 0    clonazePAM (KLONOPIN) 0.5 MG tablet Take 1 tablet by mouth 3 times daily. q4-6 hours prn throughout the day 60 tablet 0    venlafaxine (EFFEXOR) 100 MG tablet Take 4.5 tablets by mouth daily. (Patient taking differently: Take 300 mg by mouth daily. Taking 300 mg daily at this time) 90 tablet 3    triamterene-hydrochlorothiazide (MAXZIDE-25) 37.5-25 MG per tablet Take 1 tablet by mouth daily.  Potassium Chloride [place] CR (KLOR-CON M20 PO) Take 1 tablet by mouth 2 times daily.         ferrous sulfate 325 (65 FE) MG tablet Take 325 mg by mouth 2 times daily.  calcium carbonate (OSCAL) 500 MG TABS tablet Take 500 mg by mouth daily. No Known Allergies    REVIEW OF SYSTEMS  10 systems reviewed, pertinent positives per HPI otherwise noted to be negative. PHYSICAL EXAM  BP (!) 141/87   Pulse 89   Temp 98.1 °F (36.7 °C)   Resp 18   SpO2 96%    GENERAL APPEARANCE: Awake and alert. Cooperative. No acute distress. Obese. HENT: Normocephalic. Atraumatic. Mucous membranes are moist.  No drooling or stridor. NECK: Supple. No cervical lymphadenopathy. EYES: PERRL. EOM's grossly intact. HEART/CHEST: RRR. No murmurs. LUNGS: Respirations unlabored. CTAB. Good air exchange. Speaking comfortably in full sentences. ABDOMEN: diffuse discomfort with palpation but no focal tenderness. Soft. Non-distended. No masses. No organomegaly. No guarding or rebound. MUSCULOSKELETAL: No extremity edema. Compartments soft. No deformity. No tenderness in the extremities. All extremities neurovascularly intact. SKIN: Warm and dry. No acute rashes. NEUROLOGICAL: Alert and oriented. CN's 2-12 intact. No gross facial drooping. No gross focal deficits. PSYCHIATRIC: Normal mood and affect. LABS  I have reviewed all labs for this visit.    Results for orders placed or performed during the hospital encounter of 09/15/21   CBC auto differential   Result Value Ref Range    WBC 4.5 4.0 - 11.0 K/uL    RBC 5.20 4.00 - 5.20 M/uL    Hemoglobin 14.9 12.0 - 16.0 g/dL    Hematocrit 44.2 36.0 - 48.0 %    MCV 85.1 80.0 - 100.0 fL    MCH 28.7 26.0 - 34.0 pg    MCHC 33.7 31.0 - 36.0 g/dL    RDW 14.5 12.4 - 15.4 %    Platelets 545 (L) 726 - 450 K/uL    MPV 7.8 5.0 - 10.5 fL    Neutrophils % 68.8 %    Lymphocytes % 19.4 %    Monocytes % 9.8 %    Eosinophils % 1.6 %    Basophils % 0.4 %    Neutrophils Absolute 3.1 1.7 - 7.7 K/uL    Lymphocytes Absolute 0.9 (L) 1.0 - 5.1 K/uL    Monocytes Absolute 0.4 0.0 - 1.3 K/uL    Eosinophils Absolute 0.1 0.0 - 0.6 K/uL    Basophils Absolute 0.0 0.0 - 0.2 K/uL   Basic Metabolic Panel w/ Reflex to MG   Result Value Ref Range    Sodium 139 136 - 145 mmol/L    Potassium reflex Magnesium 3.4 (L) 3.5 - 5.1 mmol/L    Chloride 103 99 - 110 mmol/L    CO2 23 21 - 32 mmol/L    Anion Gap 13 3 - 16    Glucose 107 (H) 70 - 99 mg/dL    BUN 15 7 - 20 mg/dL    CREATININE 1.0 0.6 - 1.1 mg/dL    GFR Non-African American 58 (A) >60    GFR African American >60 >60    Calcium 8.6 8.3 - 10.6 mg/dL   Urinalysis, reflex to microscopic   Result Value Ref Range    Color, UA Yellow Straw/Yellow    Clarity, UA Clear Clear    Glucose, Ur Negative Negative mg/dL    Bilirubin Urine SMALL (A) Negative    Ketones, Urine Negative Negative mg/dL    Specific Gravity, UA >=1.030 1.005 - 1.030    Blood, Urine Negative Negative    pH, UA 6.0 5.0 - 8.0    Protein, UA Negative Negative mg/dL    Urobilinogen, Urine 0.2 <2.0 E.U./dL    Nitrite, Urine Negative Negative    Leukocyte Esterase, Urine Negative Negative    Microscopic Examination Not Indicated     Urine Type NotGiven    Magnesium   Result Value Ref Range    Magnesium 1.80 1.80 - 2.40 mg/dL       RADIOLOGY  None     ED COURSE/MDM  Patient seen and evaluated. Old records reviewed. Labs and imaging reviewed and results discussed with patient. Patient presenting for evaluation of nausea vomiting in light of previous positive Covid test.  Abdominal examination is benign without any focal tenderness. I do not feel that abdominal imaging is likely to reveal any acute abnormality that would . Urine with no ketones. Labs generally unremarkable. She was given Phenergan while here with improvement. She is also given her home anxiety medication with improvement of her anxiety although she is just very generally anxious about being sick at home as she lives alone. Patient was reassured and advised that she can always return to the ER with any worsening symptoms.   She states that she can also call her mother to check in periodically but does have an ill father so she states that she has not been able to talk to her mom as much as she usually does. At this time, I felt that discharge home was appropriate with close outpatient follow-up. Patient in agreement of plan. She states that she is actually prescribed Phenergan by her PCP yesterday therefore I will add Zofran that she can have at home to use as needed as well. Reasons to return to the ER were discussed at length and all questions answered at time of discharge. I estimate there is LOW risk for ACUTE APPENDICITIS, BOWEL OBSTRUCTION, CHOLECYSTITIS, DIVERTICULITIS, INCARCERATED HERNIA, PANCREATITIS, or PERFORATED BOWEL or ULCER, thus I consider the discharge disposition reasonable. Also, there is no evidence or peritonitis, sepsis, or toxicity. Opal Plasencia and I have discussed the diagnosis and risks, and we agree with discharging home to follow-up with their primary doctor. We also discussed returning to the Emergency Department immediately if new or worsening symptoms occur. We have discussed the symptoms which are most concerning (e.g., bloody stool, fever, changing or worsening pain, vomiting) that necessitate immediate return. During the patient's ED course, the patient was given:  Medications   promethazine (PHENERGAN) tablet 25 mg (25 mg Oral Given 9/15/21 1155)   clonazePAM (KLONOPIN) tablet 0.5 mg (0.5 mg Oral Given 9/15/21 1155)        CLINICAL IMPRESSION  1. Non-intractable vomiting with nausea, unspecified vomiting type    2. COVID-19    3. Anxiety        Blood pressure (!) 141/87, pulse 89, temperature 98.1 °F (36.7 °C), resp. rate 18, SpO2 96 %, not currently breastfeeding. DISPOSITION  Opal Plasencia was discharged to home in stable condition. Patient was given scripts for the following medications. I counseled patient how to take these medications.    New Prescriptions    ONDANSETRON (ZOFRAN-ODT) 4 MG DISINTEGRATING TABLET    Place 1 tablet under the tongue every 8 hours as needed for Nausea or Vomiting       Follow-up with:  Damari Metcalf [place] [address]  [phone]    Schedule an appointment as soon as possible for a visit in 2 days  For recheck      DISCLAIMER: This chart was created using Dragon dictation software. Efforts were made by me to ensure accuracy, however some errors may be present due to limitations of this technology and occasionally words are not transcribed correctly.         Payton Newman MD  09/15/21 9364

## 2021-09-15 NOTE — ED TRIAGE NOTES
Per EMS> pt from home w c/o N/V starting yesterday. S/s covid 8 days. Tested + a few days ago. Last emesis this AM. Unable to take RX anxiety meds d/t N/V. Pt very anxious. Denies BM changes. Zofran by EMS w relief.

## 2022-05-03 NOTE — PROGRESS NOTES
Bren Shell    Age 47 y.o.    female    1970    MRN [id number]    7/7/2022  Arrival Time_____________  OR Time____________[address]     Procedure(s):  RIGHT TOTAL KNEE REPLACEMENT WITH ADDUCTOR CANAL BLOCK FOR PAIN CONTROL              [facility]                      General   Surgeon(s):  Donna Chavez, MD      DAY ADMIT ___  SDS/OP ___  OUTPT IN BED ___        Phone [phone] (home)     PCP _____________________ Phone_________________ Epic ( ) Epic CE ( ) Appt ________    ADDITIONAL INFO __________________________________ Cardio/Consult _____________    NOTES _____________________________________________________________________    ____________________________________________________________________________    PAT APPT DATE:________ TIME: ________  FAXED QAD: _______  (__) H&P w/ Hospitalist  __________________________________________________________________________  Preop Nurse phone screen complete: _____________  (__) CBC     (__) W/ DIFF ___________     (__) Hgb A1C    ___________  (__) CHEST X RAY   __________  (__) LIPID PROFILE  ___________  (__) EKG   __________  (__) PT/PTT   ___________  (__) PFT's   __________  (__) BMP   ___________  (__) CAROTIDS  __________  (__) CMP   ___________  (__) VEIN MAPPING  __________  (__) U/A   ___________  (__) HISTORY & PHYSICAL __________  (__) URINE C & S  ___________  (__) CARDIAC CLEARANCE __________  (__) U/A W/ FLEX  ___________  (__) PULM.  CLEARANCE __________  (__) SERUM PREGNANCY ___________  (__) Check Epic DOS orders __________  (__) TYPE & SCREEN __________repeat ( ) (__)  __________________ __________  (__) ALBUMIN [place] ___________  (__)  __________________ __________  (__) TRANSFERRIN  ___________  (__)  __________________ __________  (__) LIVER PROFILE  ___________  (__)  __________________ __________  (__) MRSA NASAL SWAB ___________  (__) URINE PREG DOS __________  (__) SED RATE  ___________  (__) BLOOD SUGAR DOS __________  (__) C-REACTIVE PROTEIN ___________    (__) VITAMIN D HYDROXY ___________  (__) BLOOD THINNERS __________    (__) ACE/ ARBS: _____________________     (__) BETABLOCKERS __________________

## 2022-06-20 ENCOUNTER — TELEPHONE (OUTPATIENT)
Dept: ORTHOPEDIC SURGERY | Age: 52
End: 2022-06-20

## 2022-06-20 NOTE — TELEPHONE ENCOUNTER
ORTHOPAEDIC NURSE NAVIGATOR SUMMARY NOTE      Anticipated Date of Surgery: 7/7/22    Recieved Pre-Op Education: yes   In person class:no  Pt used educational link:No   Pt completed pre and post test to measure learning:No    If pt did not complete either, why not? Pt Declined      PCP: [facility]   Phone #: [phone]    Date of PCP Visit for H&P: 6/30/22    Any Noted Concerns from PCP prior to surgery:  No   If Yes, what concerns?:    IS THE PATIENT IN A PAIN MANAGEMENT PROGRAM?:   No     Review of Past Medical History Reveals History of:      Critical Lab Values:   Hgb/Hct:   Hemoglobin (g/dL)   Date Value   09/15/2021 14.9   /  Hematocrit (%)   Date Value   09/15/2021 44.2      HgbA1C:  No results found for: LABA1C   Albumin:    Lab Results   Component Value Date    LABALBU 4.0 11/10/2019      BUN/Cr:   BUN (mg/dL)   Date Value   09/15/2021 15   /  CREATININE (mg/dL)   Date Value   09/15/2021 1.0      BMI:    BMI Readings from Last 1 Encounters:   03/08/20 52.13 kg/m²        Coronary Artery Disease/HTN/CHF History: Yes- HTN      Cardiologist:     Cardiac Clearance Necessary: No    Date of Cardiac Clearance Appt: On Plavix? No,  If YES, when will they stop taking? Final Cardiac Recommendations:N/A   -On any anticoagulation-none       Diabetes History: No    Most Recent HgbA1C: N/A    PCP or Endocrine Recommendations: N/A    Nutritionist/Dietician Consult Scheduled: N/A    Final Plan For Diabetic Control: N/A   Pulmonary: COPD/Emphysema/ Use of home oxygen: NONE     Alcohol use:        DVT Risk Stratification:  Low      Vascular Consult Ordered:  NA    Date of Vascular Appt:     Hematology/Oncology Consult Ordered:  NA    Date of Hematology/Oncology Appt:     Final Recommendation For DVT Prophylaxis:   -Smoking history or use of estrogen-         BMI Greater than 40 at time of scheduling?: Yes    Has Surgeon been notified of BMI concern?  No    Weight Loss Clinic Consult Ordered: Not Applicable    Date of Wt Loss Clinic Appt:     BMI at time of surgery (if went through [facility]): Additional Medical Concerns:         Discharge Disposition Information:     Attended Pre-Hab Program: no     Anticipated Discharge Disposition:  Home with [facility] AT [facility] D/ to parents home, living with them currently   Who will be with patient at home following discharge?   Mother, patients children      Equipment pt already has:  marcelo Hernandez icemachine   Bedroom on first or second floor: First   Bathroom on first or second floor: First   Weight bearing status: full   Pre-op ambulatory status: none   Number of entry steps: none   Caregiver assistance: full time     Pt plan to DC same day: no   [facility] preference: None      Chandler Maurer RN  6/20/2022

## 2022-06-27 RX ORDER — LOSARTAN POTASSIUM 25 MG/1
25 TABLET ORAL DAILY
COMMUNITY
Start: 2022-02-23

## 2022-06-27 NOTE — PROGRESS NOTES
they will be removed, please bring a case for them. 10. If appicable,Please see your family doctor/pediatrician for a history & physical and/or concerning medications. Bring any test results/reports from your physician's office. 11. Remember to bring Blood Bank bracelet to the hospital on the day of surgery. 12. If you have a Living Will and Durable Power of  for Healthcare, please bring in a copy. 15. Notify your Surgeon if you develop any illness between now and surgery  time, cough, cold, fever, sore throat, nausea, vomiting, etc.  Please notify your surgeon if you experience dizziness, shortness of breath or blurred vision between now & the time of your surgery   14. DO NOT shave your operative site 96 hours prior to surgery. For face & neck surgery, men may use an electric razor 48 hours prior to surgery. 15. Shower the night before surgery with ___Antibacterial soap ___Hibiclens   16. To provide excellent care visitors will be limited to one in the room at any given time. 17.  Please bring picture ID and insurance card. 18.  Visit our web site for additional information:  [email or web address]/surgery.

## 2022-06-27 NOTE — PROGRESS NOTES
Preoperative Screening for Elective Surgery/Invasive Procedures While COVID-19 present in the community     Have you had any of the following symptoms? o Fever, chills  o Cough  o Shortness of breath  o Muscle aches/pain  o Diarrhea  o Abdominal pain, nausea, vomiting  o Loss or decrease in taste and / or smell   Risk of Exposure  o Have you recently been hospitalized for COVID-19 or flu-like illness, if so when?  o Recently diagnosed with COVID-19, if so when?  o Recently tested for COVID-19, if so when?  o Have you been in close contact with a person or family member who currently has or recently had COVID-19? If yes, when and in what context?  o Do you live with anybody who in the last 14 days has had fever, chills, shortness of breath, muscle aches, flu-like illness?  o Do you have any close contacts or family members who are currently in the hospital for COVID-19 or flu-like illness? If yes, assess recent close contact with this person. Indicate if the patient has a positive screen by answering yes to one or more of the above questions. Patients who test positive or screen positive prior to surgery or on the day of surgery should be evaluated in conjunction with the surgeon/proceduralist/anesthesiologist to determine the urgency of the procedure.      Pt states symptom free

## 2022-07-07 ENCOUNTER — HOSPITAL ENCOUNTER (INPATIENT)
Age: 52
LOS: 4 days | Discharge: NURSING, REHAB OR LONG-TERM CARE | DRG: 470 | End: 2022-07-11
Attending: ORTHOPAEDIC SURGERY | Admitting: ORTHOPAEDIC SURGERY
Payer: MEDICARE

## 2022-07-07 ENCOUNTER — APPOINTMENT (OUTPATIENT)
Dept: GENERAL RADIOLOGY | Age: 52
DRG: 470 | End: 2022-07-07
Attending: ORTHOPAEDIC SURGERY
Payer: MEDICARE

## 2022-07-07 ENCOUNTER — ANESTHESIA EVENT (OUTPATIENT)
Dept: OPERATING ROOM | Age: 52
DRG: 470 | End: 2022-07-07
Payer: MEDICARE

## 2022-07-07 ENCOUNTER — ANESTHESIA (OUTPATIENT)
Dept: OPERATING ROOM | Age: 52
DRG: 470 | End: 2022-07-07
Payer: MEDICARE

## 2022-07-07 DIAGNOSIS — Z96.651 STATUS POST TOTAL RIGHT KNEE REPLACEMENT: Primary | ICD-10-CM

## 2022-07-07 PROBLEM — G89.18 POST-OPERATIVE PAIN: Status: ACTIVE | Noted: 2022-07-07

## 2022-07-07 LAB
ABO/RH: NORMAL
ANTIBODY SCREEN: NORMAL

## 2022-07-07 PROCEDURE — 6370000000 HC RX 637 (ALT 250 FOR IP): Performed by: NURSE PRACTITIONER

## 2022-07-07 PROCEDURE — 6370000000 HC RX 637 (ALT 250 FOR IP): Performed by: PHYSICIAN ASSISTANT

## 2022-07-07 PROCEDURE — 2700000000 HC OXYGEN THERAPY PER DAY

## 2022-07-07 PROCEDURE — 94761 N-INVAS EAR/PLS OXIMETRY MLT: CPT

## 2022-07-07 PROCEDURE — 73560 X-RAY EXAM OF KNEE 1 OR 2: CPT

## 2022-07-07 PROCEDURE — 3600000005 HC SURGERY LEVEL 5 BASE: Performed by: ORTHOPAEDIC SURGERY

## 2022-07-07 PROCEDURE — 86850 RBC ANTIBODY SCREEN: CPT

## 2022-07-07 PROCEDURE — 3600000015 HC SURGERY LEVEL 5 ADDTL 15MIN: Performed by: ORTHOPAEDIC SURGERY

## 2022-07-07 PROCEDURE — 6360000002 HC RX W HCPCS

## 2022-07-07 PROCEDURE — C1713 ANCHOR/SCREW BN/BN,TIS/BN: HCPCS | Performed by: ORTHOPAEDIC SURGERY

## 2022-07-07 PROCEDURE — 7100000000 HC PACU RECOVERY - FIRST 15 MIN: Performed by: ORTHOPAEDIC SURGERY

## 2022-07-07 PROCEDURE — 64447 NJX AA&/STRD FEMORAL NRV IMG: CPT | Performed by: ANESTHESIOLOGY

## 2022-07-07 PROCEDURE — 86900 BLOOD TYPING SEROLOGIC ABO: CPT

## 2022-07-07 PROCEDURE — C1776 JOINT DEVICE (IMPLANTABLE): HCPCS | Performed by: ORTHOPAEDIC SURGERY

## 2022-07-07 PROCEDURE — 6360000002 HC RX W HCPCS: Performed by: ANESTHESIOLOGY

## 2022-07-07 PROCEDURE — 97162 PT EVAL MOD COMPLEX 30 MIN: CPT

## 2022-07-07 PROCEDURE — 97110 THERAPEUTIC EXERCISES: CPT

## 2022-07-07 PROCEDURE — 6360000002 HC RX W HCPCS: Performed by: PHYSICIAN ASSISTANT

## 2022-07-07 PROCEDURE — 3700000000 HC ANESTHESIA ATTENDED CARE: Performed by: ORTHOPAEDIC SURGERY

## 2022-07-07 PROCEDURE — 2500000003 HC RX 250 WO HCPCS

## 2022-07-07 PROCEDURE — 2580000003 HC RX 258: Performed by: ORTHOPAEDIC SURGERY

## 2022-07-07 PROCEDURE — 2580000003 HC RX 258: Performed by: PHYSICIAN ASSISTANT

## 2022-07-07 PROCEDURE — 0SRC06Z REPLACEMENT OF RIGHT KNEE JOINT WITH OXIDIZED ZIRCONIUM ON POLYETHYLENE SYNTHETIC SUBSTITUTE, OPEN APPROACH: ICD-10-PCS | Performed by: ORTHOPAEDIC SURGERY

## 2022-07-07 PROCEDURE — 7100000001 HC PACU RECOVERY - ADDTL 15 MIN: Performed by: ORTHOPAEDIC SURGERY

## 2022-07-07 PROCEDURE — 86901 BLOOD TYPING SEROLOGIC RH(D): CPT

## 2022-07-07 PROCEDURE — 6360000002 HC RX W HCPCS: Performed by: ORTHOPAEDIC SURGERY

## 2022-07-07 PROCEDURE — 2709999900 HC NON-CHARGEABLE SUPPLY: Performed by: ORTHOPAEDIC SURGERY

## 2022-07-07 PROCEDURE — 1200000000 HC SEMI PRIVATE

## 2022-07-07 PROCEDURE — 3700000001 HC ADD 15 MINUTES (ANESTHESIA): Performed by: ORTHOPAEDIC SURGERY

## 2022-07-07 PROCEDURE — 3E0T3BZ INTRODUCTION OF ANESTHETIC AGENT INTO PERIPHERAL NERVES AND PLEXI, PERCUTANEOUS APPROACH: ICD-10-PCS | Performed by: ORTHOPAEDIC SURGERY

## 2022-07-07 DEVICE — RALLY HV BONE CEMENT 40 GRAMS
Type: IMPLANTABLE DEVICE | Site: KNEE | Status: FUNCTIONAL
Brand: RALLY

## 2022-07-07 DEVICE — JOURNEY BCS PATELLA RESURFACING                                    ROUND 29 MM STANDARD
Type: IMPLANTABLE DEVICE | Site: KNEE | Status: FUNCTIONAL
Brand: JOURNEY

## 2022-07-07 DEVICE — JOURNEY II BCS FEMORAL OXINIUM                                    RIGHT SIZE 5
Type: IMPLANTABLE DEVICE | Site: KNEE | Status: FUNCTIONAL
Brand: JOURNEY

## 2022-07-07 DEVICE — JOURNEY II BCS XLPE ARTICULAR                                    INSERT SIZE 3-4 RIGHT 9MM
Type: IMPLANTABLE DEVICE | Site: KNEE | Status: FUNCTIONAL
Brand: JOURNEY

## 2022-07-07 DEVICE — JOURNEY TIBIAL BASEPLATE NONPOROUS                                    RIGHT SIZE 3
Type: IMPLANTABLE DEVICE | Site: KNEE | Status: FUNCTIONAL
Brand: JOURNEY

## 2022-07-07 RX ORDER — OXYCODONE HYDROCHLORIDE 5 MG/1
10 TABLET ORAL EVERY 4 HOURS PRN
Status: DISCONTINUED | OUTPATIENT
Start: 2022-07-07 | End: 2022-07-11 | Stop reason: HOSPADM

## 2022-07-07 RX ORDER — ONDANSETRON 2 MG/ML
4 INJECTION INTRAMUSCULAR; INTRAVENOUS EVERY 6 HOURS PRN
Status: DISCONTINUED | OUTPATIENT
Start: 2022-07-07 | End: 2022-07-09

## 2022-07-07 RX ORDER — SODIUM CHLORIDE 0.9 % (FLUSH) 0.9 %
5-40 SYRINGE (ML) INJECTION EVERY 12 HOURS SCHEDULED
Status: DISCONTINUED | OUTPATIENT
Start: 2022-07-07 | End: 2022-07-11 | Stop reason: HOSPADM

## 2022-07-07 RX ORDER — CLONAZEPAM 0.5 MG/1
0.5 TABLET ORAL 3 TIMES DAILY
Status: DISCONTINUED | OUTPATIENT
Start: 2022-07-07 | End: 2022-07-09

## 2022-07-07 RX ORDER — POLYETHYLENE GLYCOL 3350 17 G/17G
17 POWDER, FOR SOLUTION ORAL DAILY
Status: DISCONTINUED | OUTPATIENT
Start: 2022-07-07 | End: 2022-07-11 | Stop reason: HOSPADM

## 2022-07-07 RX ORDER — OXYCODONE AND ACETAMINOPHEN 7.5; 325 MG/1; MG/1
1 TABLET ORAL EVERY 4 HOURS PRN
Qty: 40 TABLET | Refills: 0 | Status: SHIPPED | OUTPATIENT
Start: 2022-07-07 | End: 2022-07-14

## 2022-07-07 RX ORDER — VENLAFAXINE 37.5 MG/1
300 TABLET ORAL NIGHTLY
Status: DISCONTINUED | OUTPATIENT
Start: 2022-07-07 | End: 2022-07-07 | Stop reason: SDUPTHER

## 2022-07-07 RX ORDER — LIDOCAINE HYDROCHLORIDE 20 MG/ML
INJECTION, SOLUTION INFILTRATION; PERINEURAL PRN
Status: DISCONTINUED | OUTPATIENT
Start: 2022-07-07 | End: 2022-07-07 | Stop reason: SDUPTHER

## 2022-07-07 RX ORDER — TRIAMTERENE AND HYDROCHLOROTHIAZIDE 37.5; 25 MG/1; MG/1
1 TABLET ORAL DAILY
Status: DISCONTINUED | OUTPATIENT
Start: 2022-07-08 | End: 2022-07-11 | Stop reason: HOSPADM

## 2022-07-07 RX ORDER — FENTANYL CITRATE 50 UG/ML
25 INJECTION, SOLUTION INTRAMUSCULAR; INTRAVENOUS EVERY 5 MIN PRN
Status: DISCONTINUED | OUTPATIENT
Start: 2022-07-07 | End: 2022-07-07 | Stop reason: HOSPADM

## 2022-07-07 RX ORDER — SODIUM CHLORIDE 9 MG/ML
INJECTION, SOLUTION INTRAVENOUS PRN
Status: DISCONTINUED | OUTPATIENT
Start: 2022-07-07 | End: 2022-07-11 | Stop reason: HOSPADM

## 2022-07-07 RX ORDER — OXYCODONE HYDROCHLORIDE 5 MG/1
5 TABLET ORAL EVERY 4 HOURS PRN
Status: DISCONTINUED | OUTPATIENT
Start: 2022-07-07 | End: 2022-07-11 | Stop reason: HOSPADM

## 2022-07-07 RX ORDER — VENLAFAXINE HYDROCHLORIDE 150 MG/1
300 CAPSULE, EXTENDED RELEASE ORAL NIGHTLY
Status: DISCONTINUED | OUTPATIENT
Start: 2022-07-07 | End: 2022-07-11 | Stop reason: HOSPADM

## 2022-07-07 RX ORDER — PROPOFOL 10 MG/ML
INJECTION, EMULSION INTRAVENOUS PRN
Status: DISCONTINUED | OUTPATIENT
Start: 2022-07-07 | End: 2022-07-07 | Stop reason: SDUPTHER

## 2022-07-07 RX ORDER — ONDANSETRON 2 MG/ML
INJECTION INTRAMUSCULAR; INTRAVENOUS PRN
Status: DISCONTINUED | OUTPATIENT
Start: 2022-07-07 | End: 2022-07-07 | Stop reason: SDUPTHER

## 2022-07-07 RX ORDER — DEXAMETHASONE SODIUM PHOSPHATE 4 MG/ML
INJECTION, SOLUTION INTRA-ARTICULAR; INTRALESIONAL; INTRAMUSCULAR; INTRAVENOUS; SOFT TISSUE PRN
Status: DISCONTINUED | OUTPATIENT
Start: 2022-07-07 | End: 2022-07-07 | Stop reason: SDUPTHER

## 2022-07-07 RX ORDER — ONDANSETRON 2 MG/ML
4 INJECTION INTRAMUSCULAR; INTRAVENOUS
Status: DISCONTINUED | OUTPATIENT
Start: 2022-07-07 | End: 2022-07-07 | Stop reason: HOSPADM

## 2022-07-07 RX ORDER — OXYCODONE HYDROCHLORIDE 5 MG/1
10 TABLET ORAL PRN
Status: DISCONTINUED | OUTPATIENT
Start: 2022-07-07 | End: 2022-07-07 | Stop reason: HOSPADM

## 2022-07-07 RX ORDER — MORPHINE SULFATE 2 MG/ML
2 INJECTION, SOLUTION INTRAMUSCULAR; INTRAVENOUS
Status: DISCONTINUED | OUTPATIENT
Start: 2022-07-07 | End: 2022-07-11 | Stop reason: HOSPADM

## 2022-07-07 RX ORDER — OXYCODONE HYDROCHLORIDE 5 MG/1
5 TABLET ORAL PRN
Status: DISCONTINUED | OUTPATIENT
Start: 2022-07-07 | End: 2022-07-07 | Stop reason: HOSPADM

## 2022-07-07 RX ORDER — SODIUM CHLORIDE 9 MG/ML
25 INJECTION, SOLUTION INTRAVENOUS PRN
Status: DISCONTINUED | OUTPATIENT
Start: 2022-07-07 | End: 2022-07-07 | Stop reason: HOSPADM

## 2022-07-07 RX ORDER — ACETAMINOPHEN 325 MG/1
650 TABLET ORAL EVERY 6 HOURS
Status: DISCONTINUED | OUTPATIENT
Start: 2022-07-07 | End: 2022-07-11 | Stop reason: HOSPADM

## 2022-07-07 RX ORDER — SODIUM CHLORIDE 9 MG/ML
INJECTION, SOLUTION INTRAVENOUS CONTINUOUS
Status: DISCONTINUED | OUTPATIENT
Start: 2022-07-07 | End: 2022-07-09

## 2022-07-07 RX ORDER — PROMETHAZINE HYDROCHLORIDE 25 MG/1
12.5 TABLET ORAL EVERY 6 HOURS PRN
Status: DISCONTINUED | OUTPATIENT
Start: 2022-07-07 | End: 2022-07-09

## 2022-07-07 RX ORDER — SODIUM CHLORIDE 0.9 % (FLUSH) 0.9 %
5-40 SYRINGE (ML) INJECTION PRN
Status: DISCONTINUED | OUTPATIENT
Start: 2022-07-07 | End: 2022-07-07 | Stop reason: HOSPADM

## 2022-07-07 RX ORDER — MORPHINE SULFATE 4 MG/ML
4 INJECTION, SOLUTION INTRAMUSCULAR; INTRAVENOUS
Status: DISCONTINUED | OUTPATIENT
Start: 2022-07-07 | End: 2022-07-11 | Stop reason: HOSPADM

## 2022-07-07 RX ORDER — FENTANYL CITRATE 50 UG/ML
INJECTION, SOLUTION INTRAMUSCULAR; INTRAVENOUS PRN
Status: DISCONTINUED | OUTPATIENT
Start: 2022-07-07 | End: 2022-07-07 | Stop reason: SDUPTHER

## 2022-07-07 RX ORDER — ROCURONIUM BROMIDE 10 MG/ML
INJECTION, SOLUTION INTRAVENOUS PRN
Status: DISCONTINUED | OUTPATIENT
Start: 2022-07-07 | End: 2022-07-07 | Stop reason: SDUPTHER

## 2022-07-07 RX ORDER — FENTANYL CITRATE 50 UG/ML
50 INJECTION, SOLUTION INTRAMUSCULAR; INTRAVENOUS EVERY 5 MIN PRN
Status: COMPLETED | OUTPATIENT
Start: 2022-07-07 | End: 2022-07-07

## 2022-07-07 RX ORDER — MAGNESIUM SULFATE HEPTAHYDRATE 500 MG/ML
INJECTION, SOLUTION INTRAMUSCULAR; INTRAVENOUS PRN
Status: DISCONTINUED | OUTPATIENT
Start: 2022-07-07 | End: 2022-07-07 | Stop reason: SDUPTHER

## 2022-07-07 RX ORDER — LABETALOL HYDROCHLORIDE 5 MG/ML
10 INJECTION, SOLUTION INTRAVENOUS
Status: DISCONTINUED | OUTPATIENT
Start: 2022-07-07 | End: 2022-07-07 | Stop reason: HOSPADM

## 2022-07-07 RX ORDER — SODIUM CHLORIDE 0.9 % (FLUSH) 0.9 %
5-40 SYRINGE (ML) INJECTION PRN
Status: DISCONTINUED | OUTPATIENT
Start: 2022-07-07 | End: 2022-07-11 | Stop reason: HOSPADM

## 2022-07-07 RX ORDER — CEPHALEXIN 500 MG/1
500 CAPSULE ORAL 4 TIMES DAILY
Qty: 4 CAPSULE | Refills: 0 | Status: SHIPPED | OUTPATIENT
Start: 2022-07-07 | End: 2022-07-08

## 2022-07-07 RX ORDER — SODIUM CHLORIDE 0.9 % (FLUSH) 0.9 %
5-40 SYRINGE (ML) INJECTION EVERY 12 HOURS SCHEDULED
Status: DISCONTINUED | OUTPATIENT
Start: 2022-07-07 | End: 2022-07-07 | Stop reason: HOSPADM

## 2022-07-07 RX ORDER — ENOXAPARIN SODIUM 100 MG/ML
40 INJECTION SUBCUTANEOUS DAILY
Status: DISCONTINUED | OUTPATIENT
Start: 2022-07-08 | End: 2022-07-11 | Stop reason: HOSPADM

## 2022-07-07 RX ADMIN — MORPHINE SULFATE 2 MG: 2 INJECTION, SOLUTION INTRAMUSCULAR; INTRAVENOUS at 23:06

## 2022-07-07 RX ADMIN — SODIUM CHLORIDE: 9 INJECTION, SOLUTION INTRAVENOUS at 20:48

## 2022-07-07 RX ADMIN — HYDROMORPHONE HYDROCHLORIDE 0.5 MG: 1 INJECTION, SOLUTION INTRAMUSCULAR; INTRAVENOUS; SUBCUTANEOUS at 16:13

## 2022-07-07 RX ADMIN — FENTANYL CITRATE 50 MCG: 0.05 INJECTION, SOLUTION INTRAMUSCULAR; INTRAVENOUS at 14:36

## 2022-07-07 RX ADMIN — CLONAZEPAM 0.5 MG: 0.5 TABLET ORAL at 20:47

## 2022-07-07 RX ADMIN — FENTANYL CITRATE 50 MCG: 0.05 INJECTION, SOLUTION INTRAMUSCULAR; INTRAVENOUS at 14:20

## 2022-07-07 RX ADMIN — ACETAMINOPHEN 650 MG: 325 TABLET ORAL at 20:47

## 2022-07-07 RX ADMIN — FENTANYL CITRATE 50 MCG: 50 INJECTION INTRAMUSCULAR; INTRAVENOUS at 12:56

## 2022-07-07 RX ADMIN — MORPHINE SULFATE 4 MG: 4 INJECTION, SOLUTION INTRAMUSCULAR; INTRAVENOUS at 20:46

## 2022-07-07 RX ADMIN — FENTANYL CITRATE 50 MCG: 0.05 INJECTION, SOLUTION INTRAMUSCULAR; INTRAVENOUS at 14:25

## 2022-07-07 RX ADMIN — PROPOFOL 200 MG: 10 INJECTION, EMULSION INTRAVENOUS at 11:55

## 2022-07-07 RX ADMIN — HYDROMORPHONE HYDROCHLORIDE 0.5 MG: 1 INJECTION, SOLUTION INTRAMUSCULAR; INTRAVENOUS; SUBCUTANEOUS at 14:58

## 2022-07-07 RX ADMIN — SUGAMMADEX 200 MG: 100 INJECTION, SOLUTION INTRAVENOUS at 13:44

## 2022-07-07 RX ADMIN — LIDOCAINE HYDROCHLORIDE 100 MG: 20 INJECTION, SOLUTION INFILTRATION; PERINEURAL at 11:55

## 2022-07-07 RX ADMIN — ROCURONIUM BROMIDE 20 MG: 10 SOLUTION INTRAVENOUS at 12:51

## 2022-07-07 RX ADMIN — MAGNESIUM SULFATE HEPTAHYDRATE 1 G: 500 INJECTION, SOLUTION INTRAMUSCULAR; INTRAVENOUS at 12:00

## 2022-07-07 RX ADMIN — ONDANSETRON 4 MG: 2 INJECTION INTRAMUSCULAR; INTRAVENOUS at 12:00

## 2022-07-07 RX ADMIN — DEXAMETHASONE SODIUM PHOSPHATE 8 MG: 4 INJECTION, SOLUTION INTRAMUSCULAR; INTRAVENOUS at 12:00

## 2022-07-07 RX ADMIN — FENTANYL CITRATE 50 MCG: 50 INJECTION INTRAMUSCULAR; INTRAVENOUS at 11:55

## 2022-07-07 RX ADMIN — VENLAFAXINE HYDROCHLORIDE 300 MG: 150 CAPSULE, EXTENDED RELEASE ORAL at 22:13

## 2022-07-07 RX ADMIN — ONDANSETRON 4 MG: 2 INJECTION INTRAMUSCULAR; INTRAVENOUS at 13:44

## 2022-07-07 RX ADMIN — Medication 3000 MG: at 21:30

## 2022-07-07 RX ADMIN — FENTANYL CITRATE 50 MCG: 0.05 INJECTION, SOLUTION INTRAMUSCULAR; INTRAVENOUS at 14:48

## 2022-07-07 ASSESSMENT — PAIN DESCRIPTION - PAIN TYPE
TYPE: ACUTE PAIN;SURGICAL PAIN

## 2022-07-07 ASSESSMENT — PAIN DESCRIPTION - LOCATION
LOCATION: KNEE

## 2022-07-07 ASSESSMENT — PAIN SCALES - GENERAL
PAINLEVEL_OUTOF10: 10
PAINLEVEL_OUTOF10: 9
PAINLEVEL_OUTOF10: 5
PAINLEVEL_OUTOF10: 9
PAINLEVEL_OUTOF10: 10
PAINLEVEL_OUTOF10: 9
PAINLEVEL_OUTOF10: 9
PAINLEVEL_OUTOF10: 7
PAINLEVEL_OUTOF10: 2

## 2022-07-07 ASSESSMENT — PAIN - FUNCTIONAL ASSESSMENT
PAIN_FUNCTIONAL_ASSESSMENT: NONE - DENIES PAIN
PAIN_FUNCTIONAL_ASSESSMENT: ACTIVITIES ARE NOT PREVENTED

## 2022-07-07 ASSESSMENT — PAIN DESCRIPTION - ONSET
ONSET: ON-GOING

## 2022-07-07 ASSESSMENT — PAIN DESCRIPTION - ORIENTATION
ORIENTATION: RIGHT

## 2022-07-07 ASSESSMENT — PAIN DESCRIPTION - DESCRIPTORS
DESCRIPTORS: BURNING;SHARP
DESCRIPTORS: ACHING;BURNING
DESCRIPTORS: ACHING
DESCRIPTORS: ACHING
DESCRIPTORS: SHARP;ACHING

## 2022-07-07 ASSESSMENT — PAIN DESCRIPTION - FREQUENCY
FREQUENCY: CONTINUOUS

## 2022-07-07 NOTE — PROGRESS NOTES
Occupational Therapy   Evaluation Attempt Note    Pt currently in PT evaluation with  RN reporting high BP and pt restricted to bed exercises only during PT evaluation. Pt to be admitted. OT will attempt evaluation on 7/08 as schedule permits and pt medically stable.      Esperanza Watson OTR/L

## 2022-07-07 NOTE — PROGRESS NOTES
D: Patient is still having a lot of pain in the right knee and is having some hypertension. A: Telephone call to Ortho PA to notify of admission, see new orders.

## 2022-07-07 NOTE — PROGRESS NOTES
D: Patient States pain is no better rates 9/10 A: Telephone call to Dr. [facility] about patients pain level has not changed after 200 mcg of fentanyl, see new orders.

## 2022-07-07 NOTE — ANESTHESIA PRE PROCEDURE
Department of Anesthesiology  Preprocedure Note       Name:  Ambreen Mack   Age:  47 y.o.  :  1970                                          MRN:  [id number]         Date:  2022      Surgeon: Danyell Somers):  Romario Avalos MD    Procedure: Procedure(s):  RIGHT TOTAL KNEE REPLACEMENT WITH ADDUCTOR CANAL BLOCK FOR PAIN CONTROL              [employer]    Medications prior to admission:   Prior to Admission medications    Medication Sig Start Date End Date Taking? Authorizing Provider   oxyCODONE-acetaminophen (PERCOCET) 7.5-325 MG per tablet Take 1 tablet by mouth every 4 hours as needed for Pain for up to 7 days. S/P Total Joint 22 Yes RAYSA Goldstein   apixaban (ELIQUIS) 2.5 MG TABS tablet Take 1 tablet by mouth 2 times daily for 14 days 22 Yes RAYSA Goldstein   cephALEXin (KEFLEX) 500 MG capsule Take 1 capsule by mouth 4 times daily for 1 day Take on 2022 Yes RAYSA Goldstein   losartan (COZAAR) 25 MG tablet Take 25 mg by mouth daily 22  Yes Historical Provider, MD   raNITIdine HCl (RANITIDINE 75 PO) Take by mouth in the morning and at bedtime     Historical Provider, MD   busPIRone (BUSPAR) 10 MG tablet Take 2 tablets by mouth 3 times daily. Patient taking differently: Take 15 mg by mouth 3 times daily. 14   Radha Jones MD   clonazePAM (KLONOPIN) 0.5 MG tablet Take 1 tablet by mouth 3 times daily. q4-6 hours prn throughout the day 14   Radha Jones MD   venlafaxine [facility]) 100 MG tablet Take 4.5 tablets by mouth daily. Patient taking differently: Take 300 mg by mouth daily. Taking 300 mg daily at this time 14   Radha Jones MD   triamterene-hydrochlorothiazide (MAXZIDE-25) 37.5-25 MG per tablet Take 1 tablet by mouth daily. Historical Provider, MD   Potassium Chloride [place] CR (KLOR-CON M20 PO) Take 1 tablet by mouth 2 times daily.       Historical Provider, MD       Current medications:    Current Facility-Administered Medications   Medication Dose Route Frequency Provider Last Rate Last Admin    ceFAZolin (ANCEF) 3000 mg in dextrose 5 % 100 mL IVPB  3,000 mg IntraVENous On Call to Leila Cuevas MD           Allergies:  No Known Allergies    Problem List:    Patient Active Problem List   Diagnosis Code    Depression F32. 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    Primary osteoarthritis of right knee M17.11    Status post total right knee replacement Z96.651       Past Medical History:        Diagnosis Date    Anxiety     severe    GERD (gastroesophageal reflux disease)     Hiatal hernia     Hypertension     Nausea & vomiting     Obesities, morbid (HCC)     PONV (postoperative nausea and vomiting)        Past Surgical History:        Procedure Laterality Date    ANKLE SURGERY      left ankle plate r/t fracture     SECTION      KNEE SURGERY      left knee ligament transplant    TOTAL KNEE ARTHROPLASTY Left 10/14/14    Left Total Knee left    WISDOM TOOTH EXTRACTION         Social History:    Social History     Tobacco Use    Smoking status: Never Smoker    Smokeless tobacco: Never Used   Substance Use Topics    Alcohol use:  No                                Counseling given: Not Answered      Vital Signs (Current):   Vitals:    22 1436 22 1044   BP:  136/88   Pulse:  98   Resp:  16   Temp:  97.3 °F (36.3 °C)   TempSrc:  Temporal   SpO2:  95%   Weight: 280 lb (127 kg) 280 lb (127 kg)   Height: 5' 2\" (1.575 m) 5' 2\" (1.575 m)                                              BP Readings from Last 3 Encounters:   22 136/88   09/15/21 (!) 141/87 03/09/20 (!) 148/107       NPO Status: Time of last liquid consumption: 2355                        Time of last solid consumption: 2200                        Date of last liquid consumption: 07/06/22                        Date of last solid food consumption: 07/06/22    BMI:   Wt Readings from Last 3 Encounters:   07/07/22 280 lb (127 kg)   03/08/20 285 lb (129.3 kg)   11/10/19 250 lb (113.4 kg)     Body mass index is 51.21 kg/m². CBC:   Lab Results   Component Value Date/Time    WBC 4.5 09/15/2021 10:55 AM    RBC 5.20 09/15/2021 10:55 AM    HGB 14.9 09/15/2021 10:55 AM    HCT 44.2 09/15/2021 10:55 AM    MCV 85.1 09/15/2021 10:55 AM    RDW 14.5 09/15/2021 10:55 AM     09/15/2021 10:55 AM       CMP:   Lab Results   Component Value Date/Time     09/15/2021 10:55 AM    K 3.4 09/15/2021 10:55 AM     09/15/2021 10:55 AM    CO2 23 09/15/2021 10:55 AM    BUN 15 09/15/2021 10:55 AM    CREATININE 1.0 09/15/2021 10:55 AM    GFRAA >60 09/15/2021 10:55 AM    GFRAA >60 04/22/2013 08:33 PM    AGRATIO 1.0 11/10/2019 03:50 AM    LABGLOM 58 09/15/2021 10:55 AM    GLUCOSE 107 09/15/2021 10:55 AM    PROT 7.9 11/10/2019 03:50 AM    PROT 7.0 03/13/2013 01:59 PM    CALCIUM 8.6 09/15/2021 10:55 AM    BILITOT 0.3 11/10/2019 03:50 AM    ALKPHOS 88 11/10/2019 03:50 AM    AST 12 11/10/2019 03:50 AM    ALT 11 11/10/2019 03:50 AM       POC Tests: No results for input(s): POCGLU, POCNA, POCK, POCCL, POCBUN, POCHEMO, POCHCT in the last 72 hours.     Coags:   Lab Results   Component Value Date/Time    PROTIME 12.1 10/07/2014 11:37 AM    INR 1.12 10/07/2014 11:37 AM    APTT 30.7 10/07/2014 11:37 AM       HCG (If Applicable):   Lab Results   Component Value Date    PREGTESTUR Negative 10/14/2014        ABGs: No results found for: PHART, PO2ART, PWO2MRA, PPF4DWU, BEART, C9WZKYFP     Type & Screen (If Applicable):  No results found for: LABABO, LABRH    Drug/Infectious Status (If Applicable):  No results found for: HIV, HEPCAB    COVID-19 Screening (If Applicable): No results found for: COVID19        Anesthesia Evaluation  Patient summary reviewed and Nursing notes reviewed   history of anesthetic complications: PONV. Airway: Mallampati: III  TM distance: >3 FB   Neck ROM: full  Mouth opening: > = 3 FB   Dental: normal exam         Pulmonary:Negative Pulmonary ROS and normal exam                               Cardiovascular:    (+) hypertension:,                   Neuro/Psych:   (+) psychiatric history:            GI/Hepatic/Renal:   (+) hiatal hernia, GERD:, morbid obesity     (-) liver disease and no renal disease       Endo/Other: Negative Endo/Other ROS       (-) diabetes mellitus               Abdominal:             Vascular: negative vascular ROS. Other Findings:           Anesthesia Plan      general and regional     ASA 3     (I discussed with the patient the risks and benefits of PIV, general anesthesia, IV Narcotics, PACU. All questions were answered the patient agrees with the plan. Adductor canal block for post op pain.)  Induction: intravenous. MIPS: Prophylactic antiemetics administered. Anesthetic plan and risks discussed with patient. Plan discussed with CRNA.                     Luanna Lombard, MD   7/7/2022

## 2022-07-07 NOTE — PROGRESS NOTES
A: Assessment completed and documented, call light is in reach, discussed plan of care with patient who agreed, patient is alert and oriented x 4, C/o of pain 9/10 in right knee, sharp and aching.

## 2022-07-07 NOTE — H&P
I have reviewed the history and physical and examined the patient and find no relevant changes. I have reviewed with the patient and/or family the risks, benefits, and alternatives to the procedure. Patient being given increased dosage/quantity of opoid pain medication in excess of OSMB guidelines which noted a 30 MED daily of opioids due to the fact that he/she has undergone major orthopaedic surgery as outlined in rule 4731-11-13. Dosages and further duration of the pain medication will be re-evaluated at her post op visit in 2 weeks. Patient was educated on dosing expectations and limits of prescribing as a result of the new [facility] Board rules enacted August 31, 2017. Please also note that this is not the initial opoid prescription issued to this patient but a continuation of medication utilized during the hospital admission as noted in the medical record. OARRS report has also been utilized to screen for any abuse history or suspicious activity as outlined in [place]. All efforts have been taken to prevent abuse potential and misuse of opioid medications including education, screening, and close clinical follow up.

## 2022-07-07 NOTE — PROGRESS NOTES
Physical Therapy  Facility/Department: [address] [place] [address] OR  Physical Therapy Initial Assessment/Treatment    Name: Nida Cuevas  : 1970  MRN: [id number]  Date of Service: 2022    Discharge Recommendations:  Continue to assess pending progress   PT Equipment Recommendations  Other: Continue to assess      Patient Diagnosis(es): The encounter diagnosis was Status post total right knee replacement. Past Medical History:  has a past medical history of Anxiety, GERD (gastroesophageal reflux disease), Hiatal hernia, Hypertension, Nausea & vomiting, Obesities, morbid ([address] and PONV (postoperative nausea and vomiting). Past Surgical History:  has a past surgical history that includes knee surgery; Ankle surgery;  section; [place] tooth extraction; and Total knee arthroplasty (Left, 10/14/14). Assessment   Body Structures, Functions, Activity Limitations Requiring Skilled Therapeutic Intervention: Decreased functional mobility ; Decreased ADL status; Decreased ROM; Decreased strength;Decreased safe awareness;Decreased balance;Decreased endurance; Increased pain;Decreased sensation  Assessment: pt presents to [facility] s/p R TKA with orders for FWBAT and KI. PTA, pt lives in [facility] basements with level entry, performs functional mobility independently with no AD and works part time. PT eval limited this date due to poor pain control and elevated BP. Pt tolerates AAROM x5 reps of BLE. Pt would benefit from continued skilled PT to address current deficits.  CTA for d/c recommendations pending further functional mobility assessment  Treatment Diagnosis: impaired functional mobility  Therapy Prognosis: Good  Decision Making: Medium Complexity  Requires PT Follow-Up: Yes  Activity Tolerance  Activity Tolerance: Patient limited by pain;Treatment limited secondary to medical complications     Plan   Plan  Plan: 2 times a day 7 days a week  Current Treatment Recommendations: Strengthening,Balance training,Functional mobility of Occupation:   Additional Comments: Helps parents prn     Vision/Hearing  Vision  Vision: Impaired  Vision Exceptions: Wears glasses at all times  Hearing  Hearing: Within functional limits      Cognition   Orientation  Overall Orientation Status: Within Normal Limits  Orientation Level: Oriented X4     Objective   Heart Rate: 87  Heart Rate Source: Monitor  BP: (!) 141/100  BP Location: Right lower arm  BP Method: Automatic  Patient Position: Semi fowlers  SpO2: 98 %  O2 Device: Nasal cannula  Comment: 2 L        Gross Assessment  AROM: Grossly decreased, non-functional (Pt unable to tolerate AROM RLE)  PROM: Grossly decreased, non-functional (Pt unable to tolerate PROM RLE)  Strength: Grossly decreased, non-functional  Coordination: Within functional limits  Tone: Normal  Sensation: Impaired (Pt reports altered sensation RLE)     Bed Mobility Training  Bed Mobility Training: No (Pt unable to tolerate rolling due to pain; deferred supine <> sit due to poor pain control and elevated BP)     A/AROM Exercises: Pt perform 5 reps AAROM ankle pumps, heel slides (minimal ROM due to pain), SLR, hip abduction        AM-PAC Score  -PAC Inpatient Mobility Raw Score : 6 (07/07/22 1659)  AM-PAC Inpatient T-Scale Score : 23.55 (07/07/22 1659)  Mobility Inpatient CMS 0-100% Score: 100 (07/07/22 1659)  Mobility Inpatient CMS G-Code Modifier : CN (07/07/22 1659)          Goals  Short Term Goals  Time Frame for Short term goals: 7 days (7/14/22) unless otherwise stated  Short term goal 1: Pt will perform bed mobility with min(A)  Short term goal 2: Pt will perform transfer with RW and min(A)  Short term goal 3: Pt will ambulate 25 ft with RW and min(A)  Short term goal 4: Pt will perform 10 reps of BLE exercises to improve strength and mobility by 7/8/22  Patient Goals   Patient goals : \"to transfer to a chair\"       Education  Patient Education  Education Given To: Patient  Education Provided: Role of Therapy;Plan of Care;Transfer Training  Education Method: Verbal;Printed Information/Hand-outs  Barriers to Learning: None  Education Outcome: Verbalized understanding;Demonstrated understanding   Disease Specific Education: Pt educated on weight bearing status, post-op precautions, appropriate DME, and safe mobility with AD. Pt verbalized understanding  Patient Educated in safety with car transfers and  dispensed instruction on car transfers with use of assistive device with patient demonstrating:  [x] Verbalizing understanding of appropriate technique, maintaining any ordered precautions for car transfer training s/p TJR  [] [place] with simulated car transfer with walker  [] He/she requires NA assist and will have someone at discharge to help with transfers with patient verbalizing understanding of any ordered TJR precautions employing appropriate technique. NA  [] Other: Needs further education    Therapy Time   Individual Concurrent Group Co-treatment   Time In 1545         Time Out 1606         Minutes 21         Timed Code Treatment Minutes: 11 Minutes (10 min eval)     If pt is unable to be seen after this session, please let this note serve as discharge summary. Please see case management note for discharge disposition. Thank you.     Lucy Medley, PT

## 2022-07-07 NOTE — PROGRESS NOTES
Patient tolerated block well with no complications. Patient responsive throughout and did not have any oxygen desaturation.

## 2022-07-07 NOTE — PROGRESS NOTES
A: Updated patients daughter and informed her of patient staying, daughter came back ot see patient before she went home.

## 2022-07-07 NOTE — BRIEF OP NOTE
Brief Postoperative Note      Patient: Claudette Hernández  YOB: 1970  MRN: [id number]    Date of Procedure: 7/7/2022    Pre-Op Diagnosis: OSTEOARTHRITIS RIGHT KNEE    Post-Op Diagnosis: Same       Procedure(s):  RIGHT TOTAL KNEE REPLACEMENT WITH ADDUCTOR CANAL BLOCK FOR PAIN CONTROL              [employer]    Surgeon(s):  Rekha Vargas MD    Assistant:  Surgical Assistant: Romi Cowan; Harmony Ayala  Physician Assistant: RAYSA Schmidt    Anesthesia: General    Estimated Blood Loss (mL): 847     Complications: None    Specimens:   * No specimens in log *    Implants:  Implant Name Type Inv.  Item Serial No.  Lot No. LRB No. Used Action   CEMENT BONE 40GM HI VISC [facility] - [id number]  CEMENT BONE 40GM HI VISC [facility] AND [facility] Apa [id number] Right 1 Implanted   COMPONENT FEM SZ 5 R KNEE OXINIUM BI CRUCE STBL JOURNEY II - [id number]  COMPONENT FEM SZ 5 R KNEE OXINIUM BI CRUCE STBL JOURNEY II  [employer]- [id number] Right 1 Implanted   BASEPLATE TIB SZ 3 [id number] ML68MM R KNEE NP CJ JOURNEY - [id number]  BASEPLATE TIB SZ 3 HQ40OT ML68MM R KNEE NP CJ [facility]Ch[facility] Home [employer]- [id number] Right 1 Implanted   INSERT TIB SZ 3-4 THK9MM AP48MM ML68MM R ARTC KNEE XLPE BI - [id number]  INSERT TIB SZ 3-4 THK9MM AP48MM ML68MM R ARTC KNEE XLPE BI  [employer]- [id number] Right 1 Implanted   COMPONENT PAT [id number] THK9MM STD KNEE CJ DSGN RESURF PEG - [id number]  COMPONENT PAT [id number] THK9MM STD KNEE CJ DSGN RESURF PEG  ENRIQUE AND [facility] Apa [id number] Right 1 Implanted         Drains: * No LDAs found *    Findings: OA    Electronically signed by RAYSA Schmidt on 7/7/2022 at 1:55 PM

## 2022-07-07 NOTE — PROGRESS NOTES
Patient admitted to [facility] room 1. Consents verified. Patient NPO since midnight last night. Patient to send purse with daughter and all other belongings to remain in patient locker.

## 2022-07-07 NOTE — ANESTHESIA PROCEDURE NOTES
Peripheral Block    Patient location during procedure: pre-op  Reason for block: post-op pain management and at surgeon's request  End time: 7/7/2022 11:33 AM  Staffing  Performed: anesthesiologist   Anesthesiologist: Luis Garza MD  Preanesthetic Checklist  Completed: patient identified, IV checked, site marked, risks and benefits discussed, surgical/procedural consents, equipment checked, pre-op evaluation, timeout performed, anesthesia consent given, oxygen available and monitors applied/VS acknowledged  Peripheral Block   Patient position: supine  Prep: ChloraPrep  Provider prep: mask and sterile gloves  Patient monitoring: cardiac monitor, continuous pulse ox, frequent blood pressure checks and IV access  Block type: Femoral  Adductor canal (Low Femoral)  Laterality: right  Injection technique: single-shot  Guidance: ultrasound guided  Local infiltration: lidocaine  Infiltration strength: 1 %  Local infiltration: lidocaine  Dose: 3 mL    Needle   Needle type: insulated echogenic nerve stimulator needle   Needle gauge: 21 G  Needle localization: ultrasound guidance  Needle length: 10 cm  Assessment   Injection assessment: negative aspiration for heme, no paresthesia on injection and local visualized surrounding nerve on ultrasound  Paresthesia pain: none  Slow fractionated injection: yes  Hemodynamics: stable  Real-time US image taken/store: yes  Outcomes: uncomplicated and patient tolerated procedure well    Additional Notes  Immediately prior to procedure a \"time out\" was called to verify the correct patient, allergies, laterality, procedure and equipment. Time out performed with RN    Local Anesthetic: 0.25 %  Bupivacaine   Amount: 30 ml  in 5 ml increments after negative aspiration each time.   Versed 2 mg  IV    Iliopsoas Muscle and Fascia Iliaca, Femoral artery (Deep artery to the thigh take off), Femoral Vein and Femoral Nerve are identified; the tip of the need and the spread of the local anesthetic around the Femoral nerve are visualized. The Femoral nerve appeared to be anatomically normal and there were no abnormal pathologically findings seen.

## 2022-07-08 LAB
ANION GAP SERPL CALCULATED.3IONS-SCNC: 10 MMOL/L (ref 3–16)
BUN BLDV-MCNC: 18 MG/DL (ref 7–20)
CALCIUM SERPL-MCNC: 8.6 MG/DL (ref 8.3–10.6)
CHLORIDE BLD-SCNC: 101 MMOL/L (ref 99–110)
CO2: 27 MMOL/L (ref 21–32)
CREAT SERPL-MCNC: 1 MG/DL (ref 0.6–1.1)
GFR AFRICAN AMERICAN: >60
GFR NON-AFRICAN AMERICAN: 58
GLUCOSE BLD-MCNC: 160 MG/DL (ref 70–99)
HCT VFR BLD CALC: 37.3 % (ref 36–48)
HEMOGLOBIN: 12.4 G/DL (ref 12–16)
MCH RBC QN AUTO: 27.5 PG (ref 26–34)
MCHC RBC AUTO-ENTMCNC: 33.1 G/DL (ref 31–36)
MCV RBC AUTO: 83 FL (ref 80–100)
PDW BLD-RTO: 13.6 % (ref 12.4–15.4)
PLATELET # BLD: 235 K/UL (ref 135–450)
PMV BLD AUTO: 7.3 FL (ref 5–10.5)
POTASSIUM REFLEX MAGNESIUM: 4 MMOL/L (ref 3.5–5.1)
RBC # BLD: 4.5 M/UL (ref 4–5.2)
SARS-COV-2, NAAT: NOT DETECTED
SODIUM BLD-SCNC: 138 MMOL/L (ref 136–145)
WBC # BLD: 10.4 K/UL (ref 4–11)

## 2022-07-08 PROCEDURE — 97530 THERAPEUTIC ACTIVITIES: CPT

## 2022-07-08 PROCEDURE — 85027 COMPLETE CBC AUTOMATED: CPT

## 2022-07-08 PROCEDURE — 87635 SARS-COV-2 COVID-19 AMP PRB: CPT

## 2022-07-08 PROCEDURE — 36415 COLL VENOUS BLD VENIPUNCTURE: CPT

## 2022-07-08 PROCEDURE — 6370000000 HC RX 637 (ALT 250 FOR IP): Performed by: PHYSICIAN ASSISTANT

## 2022-07-08 PROCEDURE — 97166 OT EVAL MOD COMPLEX 45 MIN: CPT

## 2022-07-08 PROCEDURE — 97116 GAIT TRAINING THERAPY: CPT

## 2022-07-08 PROCEDURE — 97110 THERAPEUTIC EXERCISES: CPT

## 2022-07-08 PROCEDURE — 6370000000 HC RX 637 (ALT 250 FOR IP): Performed by: NURSE PRACTITIONER

## 2022-07-08 PROCEDURE — 1200000000 HC SEMI PRIVATE

## 2022-07-08 PROCEDURE — 6360000002 HC RX W HCPCS: Performed by: PHYSICIAN ASSISTANT

## 2022-07-08 PROCEDURE — 2580000003 HC RX 258: Performed by: PHYSICIAN ASSISTANT

## 2022-07-08 PROCEDURE — 80048 BASIC METABOLIC PNL TOTAL CA: CPT

## 2022-07-08 PROCEDURE — 97535 SELF CARE MNGMENT TRAINING: CPT

## 2022-07-08 RX ORDER — CALCIUM CARBONATE 200(500)MG
500 TABLET,CHEWABLE ORAL 3 TIMES DAILY PRN
Status: DISCONTINUED | OUTPATIENT
Start: 2022-07-08 | End: 2022-07-11 | Stop reason: HOSPADM

## 2022-07-08 RX ORDER — LORAZEPAM 0.5 MG/1
0.5 TABLET ORAL ONCE
Status: COMPLETED | OUTPATIENT
Start: 2022-07-09 | End: 2022-07-09

## 2022-07-08 RX ORDER — PANTOPRAZOLE SODIUM 40 MG/1
40 TABLET, DELAYED RELEASE ORAL
Status: DISCONTINUED | OUTPATIENT
Start: 2022-07-08 | End: 2022-07-11 | Stop reason: HOSPADM

## 2022-07-08 RX ADMIN — MORPHINE SULFATE 4 MG: 4 INJECTION, SOLUTION INTRAMUSCULAR; INTRAVENOUS at 04:12

## 2022-07-08 RX ADMIN — OXYCODONE 5 MG: 5 TABLET ORAL at 20:37

## 2022-07-08 RX ADMIN — ACETAMINOPHEN 650 MG: 325 TABLET ORAL at 14:28

## 2022-07-08 RX ADMIN — TRIAMTERENE AND HYDROCHLOROTHIAZIDE 1 TABLET: 37.5; 25 TABLET ORAL at 09:51

## 2022-07-08 RX ADMIN — ANTACID TABLETS 500 MG: 500 TABLET, CHEWABLE ORAL at 23:02

## 2022-07-08 RX ADMIN — VENLAFAXINE HYDROCHLORIDE 300 MG: 150 CAPSULE, EXTENDED RELEASE ORAL at 20:37

## 2022-07-08 RX ADMIN — ACETAMINOPHEN 650 MG: 325 TABLET ORAL at 02:09

## 2022-07-08 RX ADMIN — ANTACID TABLETS 500 MG: 500 TABLET, CHEWABLE ORAL at 12:48

## 2022-07-08 RX ADMIN — CLONAZEPAM 0.5 MG: 0.5 TABLET ORAL at 20:37

## 2022-07-08 RX ADMIN — ACETAMINOPHEN 650 MG: 325 TABLET ORAL at 08:15

## 2022-07-08 RX ADMIN — OXYCODONE 5 MG: 5 TABLET ORAL at 10:38

## 2022-07-08 RX ADMIN — Medication 10 ML: at 21:10

## 2022-07-08 RX ADMIN — PROMETHAZINE HYDROCHLORIDE 12.5 MG: 25 TABLET ORAL at 14:42

## 2022-07-08 RX ADMIN — POLYETHYLENE GLYCOL 3350 17 G: 17 POWDER, FOR SOLUTION ORAL at 09:50

## 2022-07-08 RX ADMIN — Medication 3000 MG: at 04:15

## 2022-07-08 RX ADMIN — PROMETHAZINE HYDROCHLORIDE 12.5 MG: 25 TABLET ORAL at 23:02

## 2022-07-08 RX ADMIN — PANTOPRAZOLE SODIUM 40 MG: 40 TABLET, DELAYED RELEASE ORAL at 12:48

## 2022-07-08 RX ADMIN — CLONAZEPAM 0.5 MG: 0.5 TABLET ORAL at 09:50

## 2022-07-08 RX ADMIN — OXYCODONE 10 MG: 5 TABLET ORAL at 02:09

## 2022-07-08 RX ADMIN — OXYCODONE 10 MG: 5 TABLET ORAL at 06:35

## 2022-07-08 RX ADMIN — ACETAMINOPHEN 650 MG: 325 TABLET ORAL at 19:34

## 2022-07-08 RX ADMIN — ENOXAPARIN SODIUM 40 MG: 100 INJECTION SUBCUTANEOUS at 09:51

## 2022-07-08 RX ADMIN — CLONAZEPAM 0.5 MG: 0.5 TABLET ORAL at 14:28

## 2022-07-08 RX ADMIN — OXYCODONE 5 MG: 5 TABLET ORAL at 17:37

## 2022-07-08 ASSESSMENT — PAIN DESCRIPTION - ORIENTATION
ORIENTATION: RIGHT

## 2022-07-08 ASSESSMENT — PAIN SCALES - GENERAL
PAINLEVEL_OUTOF10: 7
PAINLEVEL_OUTOF10: 3
PAINLEVEL_OUTOF10: 4
PAINLEVEL_OUTOF10: 8
PAINLEVEL_OUTOF10: 5
PAINLEVEL_OUTOF10: 4
PAINLEVEL_OUTOF10: 5
PAINLEVEL_OUTOF10: 3
PAINLEVEL_OUTOF10: 7
PAINLEVEL_OUTOF10: 5

## 2022-07-08 ASSESSMENT — PAIN DESCRIPTION - DESCRIPTORS
DESCRIPTORS: ACHING;DISCOMFORT
DESCRIPTORS: ACHING;DISCOMFORT
DESCRIPTORS: ACHING;DISCOMFORT;DULL;THROBBING

## 2022-07-08 ASSESSMENT — PAIN DESCRIPTION - PAIN TYPE
TYPE: ACUTE PAIN;SURGICAL PAIN
TYPE: SURGICAL PAIN

## 2022-07-08 ASSESSMENT — PAIN DESCRIPTION - LOCATION
LOCATION: KNEE

## 2022-07-08 NOTE — PROGRESS NOTES
Physical Therapy  Facility/Department: [place] [id number] - MED SURG/ORTHO  Daily Treatment Note  NAME: Chaim Ingram  : 1970  MRN: [id number]    Date of Service: 2022    Discharge Recommendations:  Continue to assess pending progress   PT Equipment Recommendations  Other: Continue to assess - pt has a rw  [facility] 6 Clicks Inpatient Mobility:  AM-PAC Mobility Inpatient   How much difficulty turning over in bed?: A Little  How much difficulty sitting down on / standing up from a chair with arms?: A Little  How much difficulty moving from lying on back to sitting on side of bed?: A Little  How much help from another person moving to and from a bed to a chair?: A Lot  How much help from another person needed to walk in hospital room?: A Lot  How much help from another person for climbing 3-5 steps with a railing?: A Lot  AM-PAC Inpatient Mobility Raw Score : 15  AM-PAC Inpatient T-Scale Score : 39.45  Mobility Inpatient CMS 0-100% Score: 57.7  Mobility Inpatient CMS G-Code Modifier : CK    Patient Diagnosis(es): The encounter diagnosis was Status post total right knee replacement. Assessment   Activity Tolerance: Patient tolerated treatment well;Patient limited by pain  Other: Continue to assess - pt has a rw     Plan    Plan  Plan: 2 times a day 7 days a week  Current Treatment Recommendations: Strengthening;Balance training;Functional mobility training;Transfer training; Endurance training;Stair training;Gait training;Neuromuscular re-education;Pain management;Home exercise program;Safety education & training;Patient/Caregiver education & training; Therapeutic activities; Equipment evaluation, education, & procurement     Restrictions  Restrictions/Precautions  Restrictions/Precautions: Weight Bearing,Fall Risk,Surgical Protocols  Required Braces or Orthoses?: Yes  Lower Extremity Weight Bearing Restrictions  Right Lower Extremity Weight Bearing: Weight Bearing As Tolerated  Required Braces or Orthoses  Right Lower Extremity Brace: Knee Immobilizer     Subjective    Subjective  Subjective: Pt agrees to PT  Pain: 5/10  Orientation  Overall Orientation Status: Within Normal Limits  Orientation Level: Oriented X4  Cognition  Overall Cognitive Status: WNL     Objective   Vitals  (124/80    O2 94% RA)  Bed Mobility Training  Bed Mobility Training: Yes  Interventions: Verbal cues; Tactile cues  Supine to Sit: Minimum assistance  Scooting: Contact-guard assistance  Balance  Sitting: Intact  Standing: Impaired  Standing - Static: Constant support  Standing - Dynamic: Constant support  Transfer Training  Transfer Training: Yes  Overall Level of Assistance: Assist X2;Minimum assistance (min 2 to rw, min2, then w/ practice min1 mateo lift)  Sit to Stand: Assist X2;Minimum assistance (min 2 to rw, min2, then w/ practice min1 mateo lift)  Stand to Sit: Minimum assistance  Toilet Transfer: Moderate assistance (stedy lift)  Gait Training  Gait Training: Yes  Gait  Overall Level of Assistance: Assist X2 (mod 1 + min 1)  Interventions: Weight shifting training/pressure relief;Verbal cues  Base of Support: Widened  Speed/Francoise: Delayed  Step Length: Left shortened;Right shortened  Gait Abnormalities: Decreased step clearance  Distance (ft): 4 Feet  Assistive Device: Walker, rolling     PT Exercises  A/AROM Exercises: Pt perform    10 reps AAROM ankle pumps, heel slides (minimal ROM due to pain), SLR max A, hip abduction, GS, QS     Safety Devices  Type of Devices: Call light within reach;Gait belt;Nurse notified; Patient at risk for falls; Left in chair;Chair alarm in place       Goals  Short Term Goals  Time Frame for Short term goals: 7 days (7/14/22) unless otherwise stated  Short term goal 1: Pt will perform bed mobility with min(A)  -7/08 met; N goal: mod indep  Short term goal 2: Pt will perform transfer with RW and min(A)  -7/08 min2  Short term goal 3: Pt will ambulate 25 ft with RW and min(A)  -7/08 4; rw mod1+min1  Short term goal 4: Pt will perform 10 reps of BLE exercises to improve strength and mobility by 7/8/22  -7/08 initiated  Patient Goals   Patient goals : \"to transfer to a chair\" -7/08 met    Education  Patient Education  Education Given To: Patient  Education Provided: Role of Therapy;Plan of Care;Transfer Training  Education Method: Verbal;Printed Information/Hand-outs  Barriers to Learning: None  Education Outcome: Verbalized understanding;Demonstrated understanding    Therapy Time   Individual Concurrent Group Co-treatment   Time In 0805         Time Out 0845         Minutes 40         Timed Code Treatment Minutes: 40 Minutes     SECOND SESSION:    Subjective:  Pain 7/10    THEREX:   Ankle pumps 15  Gluteal sets 10  Quadricep Isometrics 10  Heel slides 10  SAQ 10 mod Assist  SLR mod Assist    BED MOBILITY:  Sup<>sit requires up to mod A    TRANSFERS:  Sit<>stand with mod A    GAIT:  Patient unable to amb this session d/t c/o severe pain in RLE and nausea. RN aware and will address with meds. Pt offered a CP for RLE, but turned down.     Education:   Educated patient in orthopedic precautions with patient verbalizing understanding     Disposition:Patient with call light in reach and alarm in place at end of session with patient in bed  Second Session Therapy Time:   Individual Concurrent Group Co-treatment   Time In 1440         Time Out 1505         Minutes 25           Timed Code Treatment Minutes:  [address]

## 2022-07-08 NOTE — DISCHARGE SUMMARY
Department of Orthopedic Surgery  Physician Assistant   Discharge Summary    The Silas Butler is a 47 y.o. female admitted for Right TKR. Silas Butler was admitted to the floor following Her recovery in the PACU. Discharge Diagnosis  right TKR    Current Inpatient Medications    Current Facility-Administered Medications: pantoprazole (PROTONIX) tablet 40 mg, 40 mg, Oral, QAM AC  calcium carbonate (TUMS) chewable tablet 500 mg, 500 mg, Oral, TID PRN  clonazePAM (KLONOPIN) tablet 0.5 mg, 0.5 mg, Oral, TID  0.9 % sodium chloride infusion, , IntraVENous, Continuous  sodium chloride flush 0.9 % injection 5-40 mL, 5-40 mL, IntraVENous, 2 times per day  sodium chloride flush 0.9 % injection 5-40 mL, 5-40 mL, IntraVENous, PRN  0.9 % sodium chloride infusion, , IntraVENous, PRN  acetaminophen (TYLENOL) tablet 650 mg, 650 mg, Oral, Q6H  morphine (PF) injection 2 mg, 2 mg, IntraVENous, Q2H PRN **OR** morphine sulfate (PF) injection 4 mg, 4 mg, IntraVENous, Q2H PRN  oxyCODONE (ROXICODONE) immediate release tablet 5 mg, 5 mg, Oral, Q4H PRN **OR** oxyCODONE (ROXICODONE) immediate release tablet 10 mg, 10 mg, Oral, Q4H PRN  polyethylene glycol (GLYCOLAX) packet 17 g, 17 g, Oral, Daily  promethazine (PHENERGAN) tablet 12.5 mg, 12.5 mg, Oral, Q6H PRN **OR** ondansetron (ZOFRAN) injection 4 mg, 4 mg, IntraVENous, Q6H PRN  enoxaparin (LOVENOX) injection 40 mg, 40 mg, SubCUTAneous, Daily  HYDROmorphone (DILAUDID) injection 0.25 mg, 0.25 mg, IntraVENous, Q10 Min PRN **OR** HYDROmorphone (DILAUDID) injection 0.5 mg, 0.5 mg, IntraVENous, Q10 Min PRN  triamterene-hydroCHLOROthiazide (MAXZIDE-25) 37.5-25 MG per tablet 1 tablet, 1 tablet, Oral, Daily  venlafaxine (EFFEXOR XR) extended release capsule 300 mg, 300 mg, Oral, Nightly    Post-operatively the patients diet was advanced as tolerated and their dressing was changed on POD #1. The incision is dressing in place, clean, dry and intact with no signs of infection.   The patient remained neurovascularly intact in the right lower and had intact pulses distally. Patients calf remained soft and showed no evidence of DVT. The patient was able to move their right lower extremity without any problems post-operatively. Physical therapy and occupational therapy were consulted and began working with the patient post-operatively. The patient progressed with PT/OT as would be expected and continued to improve through their stay. The patients pain was initially controlled with IV medications but we were able to transition to oral pain medications soon after arrival to the floor and their pain remained under good control through their hospital stay. From a medical standpoint the patient remained stable and continued to have the medicine team follow throughout their stay. The patient will be discharged at this time to Rehab  with their current diet restrictions and will continue to follow the precautions outlined to them by us and PT/OT. Condition on Discharge: Stable    Plan  Return visit in 2 weeks. .  Patient was instructed on the use of pain medications, the signs and symptoms of infection, and was given our number to call should they have any questions or concerns following discharge. For opioid prescriptions given at discharge the following statement is provided for compliance with [employer]SM[employer] rules. Patient being given increased dosage/quantity of opoid pain medication in excess of OSMB guidelines which noted a 30 MED daily of opioids due to the fact that he/she has undergone major orthopaedic surgery as outlined in rule 4731-11-13. Dosages and further duration of the pain medication will be re-evaluated at her post op visit in 2 weeks. Patient was educated on dosing expectations and limits of prescribing as a result of the new [facility] Board rules enacted August 31, 2017.   Please also note that this is not the initial opoid prescription issued to this patient but a continuation of medication utilized during the hospital admission as noted in the medical record. OARRS report has also been utilized to screen for any abuse history or suspicious activity as outlined in [place]. All efforts have been taken to prevent abuse potential and misuse of opioid medications including education, screening, and close clinical follow up.

## 2022-07-08 NOTE — PROGRESS NOTES
Pt complaining of mid chest discomfort and states she has been burping. Vital signs obtained. Khloe TABARES sent perfect serve message. Waiting for response.

## 2022-07-08 NOTE — OP NOTE
[facility] [address], [place] [address]                                OPERATIVE REPORT    PATIENT NAME: Esmer Cardenas                       :        1970  MED REC NO:   [id number]                          ROOM:       1776  ACCOUNT NO:   [de-identified]                           ADMIT DATE: 2022  PROVIDER:     Yoly Clancy MD    DATE OF PROCEDURE:  2022    SURGEON:  Stephen Ortiz MD    FIRST ASSISTANT:  RAYSA Mays    PREOPERATIVE DIAGNOSIS:  Severe osteoarthritis of the right knee,  715. 36. POSTOPERATIVE DIAGNOSIS:  Severe osteoarthritis of the right knee,  715.36. OPERATIVE PROCEDURE:  Right Floyd and Nephew Journey II total knee  replacement using size 5 Oxinium femoral component, size 3 tibial  component, 9-mm deep flexion XLPE polyethylene insert, and 29-mm onlay  patella. TOURNIQUET TIME:  300 mmHg for 57 minutes. DRAINS:  None. COMPLICATIONS:  None. X-RAYS:  None. ANTIBIOTICS:  As per SCIP protocol, based upon patient's age, weight,  and allergies. INSTRUMENT COUNT:  Correct x2. ESTIMATED BLOOD LOSS:  Less than 100 mL. DISPOSITION:  To the recovery room. X-rays in the recovery room ordered  of the operative knee. INDICATIONS FOR SURGERY:  The patient is 47years old. The  above-mentioned patient presents for elective total knee replacement  arthroplasty on the date of surgery. The history is that the patient  has well-outlined records from [facility]. The patient has  failed nonoperative measures with respect to control of patient's pain  and function. There has been an extensive discussion regarding the  risks, benefits, and potential complications of this procedure, as well  as normal rehabilitative protocol.   The patient specifically voiced  understanding to concerns with respect to surgical infection, deep vein  thrombosis, dystrophy, arthrofibrosis, delayed rehabilitation potential,  need for manipulation, periprosthetic fractures, neurologic injury, etc.  The patient voiced understanding to all of this and elected to have the  procedure done. All questions were answered. DETAILS OF SURGERY:  The patient was seen in the preanesthesia holding  area, and I personally initialed the operative site and answered any  further questions. They were then taken to the operating room, where  anesthesia was induced and maintained. This was well documented in  their records from the hospital with respect to the anesthesia personnel  and the type of anesthesia which they performed, including nerve blocks. The patient's leg was then positioned, prepped and draped in the usual  fashion for total knee replacement arthroplasty. ChloraPrep was  utilized as the preparation. The right leg was prepped and draped, and  Ioban drape was applied after the surgical incision was marked. A  longitudinal incision was made and then carried down through the skin  and soft tissues after inflation of the pneumatic tourniquet at 300  mmHg. A medial arthrotomy was performed with an incision passing just  along the most medial side of the extensor mechanism and the vastus  medialis obliquus. The patella was then retracted laterally. At this  point, careful attention was made to removal of osteophytes and soft  tissue balancing, with removal of any adhesions, particularly around the  lateral peripatellar region. Utilizing the [facility] and [employer] protocol for the above-mentioned  prosthesis, the course of femoral and tibial cuts were made. Femoral  cuts were addressed first.  The distal cut first protocol was used with  the patient ultimately being sized for the aforementioned trial.  This  gave excellent fixation both in the anteroposterior and mediolateral  dimensions. Attention was then drawn toward the tibia.   The preliminary cut was made  in the tibia, removing approximately 2 mm of bone based upon the  patient's overall tibial anatomy. The trial tibia was sized and placed  in the aforementioned thickness and size as mentioned above. This gave  excellent position and range of motion after trial components were  utilized. Any further tissue balancing that was necessary was performed  at this juncture. The patella was addressed. Approximately 10 mm of bone was removed from  the patella using an oscillating saw. The onlay patellar component was  sized and appropriately positioned. The knee was then taken through a  trial range of motion with excellent patellar tracking. At this point, copious irrigation was performed with the pulsatile  lavage, and the bone was prepped for cement. The permanent components  were then cemented into position. After the cement hardened, the knee  was again taken through a range of motion and gave the same excellent  alignment and stability. The permanent polyethylene prosthesis was applied to the tibia. The  pneumatic tourniquet was deflated at this point, and hemostasis was  obtained with Bovie electrocauterization. The wound was then closed in  a layered fashion. The patient was stable and taken to the recovery room with a knee  immobilization device in place.         Malik Urbano MD    D: 07/07/2022 13:28:40       T: 07/07/2022 20:37:07     [email or web address]  Job#: [id number]     Doc#: [id number]    CC:

## 2022-07-08 NOTE — PLAN OF CARE
Problem: Pain  Goal: Verbalizes/displays adequate comfort level or baseline comfort level  Outcome: Progressing   Pain frequently assessed. See MAR. Staff assisting with repositioning and ice packs applied intermittently to incision.

## 2022-07-08 NOTE — PROGRESS NOTES
4 Eyes Skin Assessment     NAME:  Olga Hernandez  YOB: 1970  MEDICAL RECORD NUMBER:  [id number]    The patient is being assess for  Shift Handoff    I agree that 2 RN's have performed a thorough Head to Toe Skin Assessment on the patient. ALL assessment sites listed below have been assessed. Areas assessed by both nurses:    Head, Face, Ears, Shoulders, Back, Chest, Arms, Elbows, Hands, Sacrum. Buttock, Coccyx, Ischium and Legs. Feet and Heels        Does the Patient have a Wound?  Other surgical incision - s/p right TKR       Hugo Prevention initiated:  Yes   Wound Care Orders initiated:  Yes    Pressure Injury (Stage 3,4, Unstageable, DTI, NWPT, and Complex wounds) if present place referral/consult order under [de-identified] NA    New and Established Ostomies if present place consult order under : NA      Nurse 1 eSignature: Electronically signed by Valentino Oakland, RN on 7/8/22 at 7:15 AM EDT    **SHARE this note so that the co-signing nurse is able to place an eSignature**    Nurse 2 eSignature:

## 2022-07-08 NOTE — CARE COORDINATION
CASE MANAGEMENT INITIAL ASSESSMENT    Reviewed chart and completed assessment with patient: Patient   Family present: None  Explained Case Management role/services. Primary contact information: [address] :   Primary Decision Maker: Sven Prather - Parent - [phone]    Secondary Decision Maker: Veena Sampson - Brother/Sister - [phone]        Can this person be reached and be able to respond quickly, such as within a few minutes or hours? Yes    Admit date/status:07/07/2022 Inpatient   Diagnosis: Post-operative pain  Is this a Readmission?:  No      Insurance:[facility] Medicare     Precert required for SNF: Yes       3 night stay required: No    Living arrangements, Adls, care needs, prior to admission: Home with parent will stay in basement with walk out no steps to enter, mother is able to support min assist [place] 77 at home:  Walker_x_Cane_xRTS__ BSC__Shower Chair__  02__ HHN__ CPAP__  BiPap__  Hospital Bed__ W/C___ Other_____    Services in the home and/or outpatient, prior to [address] [place] [address] if recs and [facility] if recs    Current PCP:Dr. Gina Carrion              Medications: Denies concern with cost or obtaining     Transportation needs: Mother vs stretcher pending pain/placement     PT/OT recs:SNF    Hospital Exemption Notification (HEN): need for SNF    Barriers to discharge: pain/ cert     Plan/comments: Patient from home IPTA. DC plans home with mother and [facility] vs [facility]-referral cent pending review. As of current Patient is feeling SNF may be best placement re pain. TARI Moon       ECOC on chart for MD signature  [phone]: Patient accepted at [address] started will need rapid COVID within 50 hr.Rika [address] [facility], [address]: Precert approved. TARI Moon

## 2022-07-08 NOTE — PROGRESS NOTES
Department of Orthopedic Surgery  Physician Assistant   Progress Note        Subjective:  Feeling better, some belching earlier improved with tums. Doing well postoperatively. . pain is perceived as moderate (4-6 pain scale)      Vitals  VITALS:  BP (!) 141/87   Pulse 94   Temp 98.1 °F (36.7 °C) (Oral)   Resp 14   Ht 5' 2\" (1.575 m)   Wt 280 lb (127 kg)   LMP 10/31/2017 (Approximate)   SpO2 96%   PF (!) 18 L/min   BMI 51.21 kg/m²     PHYSICAL EXAM:    Orientation:  alert and oriented to person, place and time    Right Lower Extremity    Incision:  dressing in place, clean, dry and intact    Lower Extremity Motor :    Moving lower extremities without difficulty today. Able to dorsiflex and plantar flex foot/ankle. Lower Extremity Sensory:   Neurovascularly intact to gross sensation and touch in lower extremities. Pulses:    present 2+ bilaterally lower extremities. Abnormal Exam findings:  none    Brace: Intact and will continue to wear during inpatient stay. LABS:    HgB:    Lab Results   Component Value Date/Time    HGB 12.4 07/08/2022 05:37 AM     INR:  No results found for: PTINR  CBC:   Lab Results   Component Value Date/Time    WBC 10.4 07/08/2022 05:37 AM    RBC 4.50 07/08/2022 05:37 AM    HGB 12.4 07/08/2022 05:37 AM    HCT 37.3 07/08/2022 05:37 AM    MCV 83.0 07/08/2022 05:37 AM    MCH 27.5 07/08/2022 05:37 AM    MCHC 33.1 07/08/2022 05:37 AM    RDW 13.6 07/08/2022 05:37 AM     07/08/2022 05:37 AM    MPV 7.3 07/08/2022 05:37 AM       ASSESSMENT AND PLAN:    Post operative day 1 status post right total Total knee arthroplasty.     1:  Weight bearing as tolerated  2:  Continue Deep venous thrombosis prophylaxis  3:  Continue physical therapy  4:  D/C Plan:  Walker coyle from Ortho standoint for D/C to SNF today, NITZA and D/C signed  5:  Continue Pain Control

## 2022-07-08 NOTE — DISCHARGE INSTR - COC
Continuity of Care Form    Patient Name: Aleida Yu   :  1970  MRN:  [id number]    Admit date:  2022  Discharge date:  ***    Code Status Order: Prior   Advance Directives:   [facility] Documentation       Date/Time Healthcare Directive Type of Healthcare Directive Copy in [address] Agent's Name Healthcare Agent's Phone Number    [phone] Yes, patient has an advance directive for healthcare treatment -- No, copy requested from family -- -- --            Admitting Physician:  Candy Houser MD  PCP: [facility]    Discharging Nurse: [facility]/Room#: 6366/4732-98  Discharging Unit Phone Number: ***    Emergency Contact:   Extended Emergency Contact Information  Primary Emergency Contact: Ector Betancourt, [address] Phone: [phone]  Mobile Phone: [phone]  Relation: Parent   needed? No  Secondary Emergency Contact: Denisse Valles  Address: [place] ([place])           [phone]  Home Phone: [phone]  Relation: Brother/Sister    Past Surgical History:  Past Surgical History:   Procedure Laterality Date    ANKLE SURGERY      left ankle plate r/t fracture     SECTION      KNEE SURGERY      left knee ligament transplant    TOTAL KNEE ARTHROPLASTY Left 10/14/14    Left Total Knee left    WISDOM TOOTH EXTRACTION         Immunization History:   Immunization History   Administered Date(s) Administered    Tdap (Boostrix, Adacel) 2019       Active Problems:  Patient Active Problem List   Diagnosis Code    Depression F32. 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.659    Primary osteoarthritis of right knee M17.11    Status post total right knee replacement Z96.651    Post-operative pain G89.18       Isolation/Infection:   Isolation            No Isolation          Patient Infection Status       None to display            Nurse Assessment:  Last Vital Signs: BP (!) 141/87   Pulse 94   Temp 98.1 °F (36.7 °C) (Oral)   Resp 14   Ht 5' 2\" (1.575 m)   Wt 280 lb (127 kg)   LMP 10/31/2017 (Approximate)   SpO2 96%   PF (!) 18 L/min   BMI 51.21 kg/m²     Last documented pain score (0-10 scale): Pain Level: 3  Last Weight:   Wt Readings from Last 1 Encounters:   07/07/22 280 lb (127 kg)     Mental Status:  oriented and alert    IV Access:  - None    Nursing Mobility/ADLs:  Walking   Assisted  Transfer  Assisted  Bathing  Assisted  Dressing  Assisted  Toileting  Assisted  Feeding  Independent  Med Admin  Independent  Med Delivery   whole    Wound Care Documentation and Therapy:  Incision 07/07/22 Knee Anterior;Right (Active)   Dressing Status Clean;Dry; Intact 07/08/22 0812   Dressing/Treatment Ace wrap 07/08/22 0812   Closure Sutures 07/07/22 1339   Margins Approximated 07/07/22 1339   Drainage Amount None 07/08/22 0812   Number of days: 1        Elimination:  Continence: Bowel: Yes  Bladder: Yes  Urinary Catheter: None   Colostomy/Ileostomy/Ileal Conduit: No       Date of Last BM: 7/10    Intake/Output Summary (Last 24 hours) at 7/8/2022 1320  Last data filed at 7/8/2022 1249  Gross per 24 hour   Intake 715 ml   Output 175 ml   Net 540 ml     I/O last 3 completed shifts: In: 170 [P.O.:120; IV Piggyback:50]  Out: 175 [Urine:175]    Safety Concerns:      At Risk for Falls    Impairments/Disabilities:      None    Nutrition Therapy:  Current Nutrition Therapy:   - Oral Diet:  General    Routes of Feeding: Oral  Liquids: No Restrictions  Daily Fluid Restriction: no  Last Modified Barium Swallow with Video (Video Swallowing Test): not done    Treatments at the Time of Hospital Discharge:   Respiratory Treatments: ***  Oxygen Therapy:  is not on home oxygen therapy. Ventilator:    - No ventilator support    Rehab Therapies: Physical Therapy and Occupational Therapy  Weight Bearing Status/Restrictions: No weight bearing restrictions  Other Medical Equipment (for information only, NOT a DME order):  walker  Other Treatments: ***    Patient's personal belongings (please select all that are sent with patient):  Pt taken down with all belongings via transport    RN SIGNATURE:  Electronically signed by Sg Delarosa RN on 7/11/22 at 10:37 AM EDT    CASE MANAGEMENT/SOCIAL WORK SECTION    Inpatient Status Date: 07/07/2022    Readmission Risk Assessment Score:  Readmission Risk              Risk of Unplanned Readmission:  7           Discharging to Facility/ Agency   [facility]   [phone]     / signature: Electronically signed by Noel Roberson RN on 7/11/22 at 10:00AM EDT    PHYSICIAN SECTION    Prognosis: Good    Condition at Discharge: Stable    Rehab Potential (if transferring to Rehab): Good    Recommended Labs or Other Treatments After Discharge: N/A    Physician Certification: I certify the above information and transfer of Eve Brown  is necessary for the continuing treatment of the diagnosis listed and that she requires [facility] for less 30 days.      Update Admission H&P: No change in H&P    PHYSICIAN SIGNATURE:  Electronically signed by RAYSA Lennon on 7/11/2022 9:24 am

## 2022-07-08 NOTE — PROGRESS NOTES
Occupational Therapy  Facility/Department: [place] [address] - MED SURG/ORTHO  Occupational Therapy Initial Assessment & Treatment    Name: Inés Dunn  : 1970  MRN: [id number]  Date of Service: 2022    Discharge Recommendations:  [address]       Patient Diagnosis(es): The encounter diagnosis was Status post total right knee replacement. Past Medical History:  has a past medical history of Anxiety, GERD (gastroesophageal reflux disease), Hiatal hernia, Hypertension, Nausea & vomiting, Obesities, morbid ([address] and PONV (postoperative nausea and vomiting). Past Surgical History:  has a past surgical history that includes knee surgery; Ankle surgery;  section; [place] tooth extraction; and Total knee arthroplasty (Left, 10/14/14). Assessment   Performance deficits / Impairments: Decreased functional mobility ; Decreased ADL status; Decreased strength;Decreased endurance;Decreased balance;Decreased coordination  Assessment: Pt is a 47 yr old F s/p RTKA. 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 Pt has h/o LTKA in  and recieved therapy services in SNF for. Pt tolerated therapy fair this date and was limited by pain and anxiety. Pt completed functional mobility tasks w/ Whit overall and use of Mariola reyes for functional transfers. Pt completed LB dressing w/ max assist d/t pain. Pt currently presents below her PLOF and would benefit from skilled services while inpatient as well as OT services in a SNF setting upon d/c to maximize occupational engagement.   Prognosis: Fair  Decision Making: Medium Complexity  REQUIRES OT FOLLOW-UP: Yes  Activity Tolerance  Activity Tolerance: Patient Tolerated treatment well;Patient limited by fatigue;Patient limited by pain        Plan   Plan  Times per Week: 4-6x/wk  Current Treatment Recommendations: Strengthening,Balance training,Functional mobility training,Endurance training,Safety education & training,Patient/Caregiver education & training,Self-Care / ADL     Restrictions  Restrictions/Precautions  Restrictions/Precautions: Weight Bearing,Fall Risk,Surgical Protocols  Required Braces or Orthoses?: Yes  Lower Extremity Weight Bearing Restrictions  Right Lower Extremity Weight Bearing: Weight Bearing As Tolerated  Required Braces or Orthoses  Right Lower Extremity Brace: Knee Immobilizer    Subjective   General  Chart Reviewed: Yes  Patient assessed for rehabilitation services?: Yes  Subjective  Subjective: Pt agreeable to therapy but reports feeling very anxious about moving; states her anxiety is what is limiting her  Pain: 5/10    Social/Functional History  Social/Functional History  Lives With: Parent  Type of Home: House  Home Access: Level entry (into basement apartment, pt has bedroom and full bathroom)  Bathroom Shower/Tub: Walk-in shower  Bathroom Toilet: Standard  Home Equipment: Elverna Corti, rolling,Cane  Has the patient had two or more falls in the past year or any fall with injury in the past year?: Yes (1 trip and fall in August)  ADL Assistance: Independent  Homemaking Assistance: Independent  Ambulation Assistance: Independent  Transfer Assistance: Independent  Active : Yes  Occupation: Part time employment  Type of Occupation:   Additional Comments: Helps parents prn     Objective   Heart Rate: 96  Heart Rate Source: Monitor  BP: 124/80  BP Location: Left lower arm  BP Method: Automatic  Patient Position: Semi fowlers  MAP (Calculated): 94.67  Resp: 18  SpO2: 93 %  O2 Device: None (Room air)        Safety Devices  Type of Devices: Call light within reach;Gait belt;Nurse notified; Patient at risk for falls; Left in chair;Chair alarm in place  Bed Mobility Training  Bed Mobility Training: Yes  Interventions: Verbal cues; Tactile cues  Supine to Sit: Minimum assistance  Scooting: Contact-guard assistance  Balance  Sitting: CMS 0-100% Score: 56.46 (07/08/22 0944)  ADL Inpatient CMS G-Code Modifier : CK (07/08/22 0944)    Goals  Short Term Goals  Time Frame for Short term goals: 1 week (7/15) unless otherwise specified  Short Term Goal 1: Pt will complete STS transfer to RW w/ CGA  Short Term Goal 2: Pt will complete toileting w/ Whit and [place] stedy by 7/11  Short Term Goal 3: Pt will complete 2-3 ADLs standing at sink w/ RW in prep for ADLs  Short Term Goal 4: Pt will tolerate 5min standing at RW while engaging in meanginful activity to improve endurance  Patient Goals   Patient goals : \"walk to the bathroom on my own\" 7/08/22. Pt did not meet stated goal this date d/t limitations from anxiety and pain and unable to transfer/ambulate at this time       Therapy Time   Individual Concurrent Group Co-treatment   Time In 0803         Time Out 0852         Minutes 49         Timed Code Treatment Minutes: 39 Minutes (10 min eval)     If pt is unable to be seen after this session, please let this note serve as discharge summary. Please see case management note for discharge disposition. Thank you.     Candy Olivares OTR/L

## 2022-07-09 LAB
EKG ATRIAL RATE: 105 BPM
EKG DIAGNOSIS: NORMAL
EKG P AXIS: 65 DEGREES
EKG P-R INTERVAL: 136 MS
EKG Q-T INTERVAL: 492 MS
EKG QRS DURATION: 66 MS
EKG QTC CALCULATION (BAZETT): 650 MS
EKG R AXIS: 34 DEGREES
EKG T AXIS: 49 DEGREES
EKG VENTRICULAR RATE: 105 BPM
HCT VFR BLD CALC: 35.3 % (ref 36–48)
HEMOGLOBIN: 11.6 G/DL (ref 12–16)
MCH RBC QN AUTO: 27.4 PG (ref 26–34)
MCHC RBC AUTO-ENTMCNC: 32.9 G/DL (ref 31–36)
MCV RBC AUTO: 83.4 FL (ref 80–100)
PDW BLD-RTO: 13.8 % (ref 12.4–15.4)
PLATELET # BLD: 221 K/UL (ref 135–450)
PMV BLD AUTO: 7.4 FL (ref 5–10.5)
RBC # BLD: 4.23 M/UL (ref 4–5.2)
TROPONIN: <0.01 NG/ML
TROPONIN: <0.01 NG/ML
WBC # BLD: 12.8 K/UL (ref 4–11)

## 2022-07-09 PROCEDURE — 36415 COLL VENOUS BLD VENIPUNCTURE: CPT

## 2022-07-09 PROCEDURE — 93005 ELECTROCARDIOGRAM TRACING: CPT | Performed by: INTERNAL MEDICINE

## 2022-07-09 PROCEDURE — 6360000002 HC RX W HCPCS: Performed by: PHYSICIAN ASSISTANT

## 2022-07-09 PROCEDURE — 97110 THERAPEUTIC EXERCISES: CPT

## 2022-07-09 PROCEDURE — 97535 SELF CARE MNGMENT TRAINING: CPT

## 2022-07-09 PROCEDURE — 6370000000 HC RX 637 (ALT 250 FOR IP): Performed by: PHYSICIAN ASSISTANT

## 2022-07-09 PROCEDURE — 97116 GAIT TRAINING THERAPY: CPT

## 2022-07-09 PROCEDURE — 6370000000 HC RX 637 (ALT 250 FOR IP): Performed by: INTERNAL MEDICINE

## 2022-07-09 PROCEDURE — 97530 THERAPEUTIC ACTIVITIES: CPT

## 2022-07-09 PROCEDURE — 84484 ASSAY OF TROPONIN QUANT: CPT

## 2022-07-09 PROCEDURE — 85027 COMPLETE CBC AUTOMATED: CPT

## 2022-07-09 PROCEDURE — 6370000000 HC RX 637 (ALT 250 FOR IP): Performed by: NURSE PRACTITIONER

## 2022-07-09 PROCEDURE — 93010 ELECTROCARDIOGRAM REPORT: CPT | Performed by: INTERNAL MEDICINE

## 2022-07-09 PROCEDURE — 1200000000 HC SEMI PRIVATE

## 2022-07-09 PROCEDURE — 2580000003 HC RX 258: Performed by: PHYSICIAN ASSISTANT

## 2022-07-09 RX ORDER — CLONAZEPAM 0.5 MG/1
0.5 TABLET ORAL 4 TIMES DAILY PRN
Status: DISCONTINUED | OUTPATIENT
Start: 2022-07-09 | End: 2022-07-11 | Stop reason: HOSPADM

## 2022-07-09 RX ORDER — ASPIRIN 81 MG/1
81 TABLET, CHEWABLE ORAL DAILY
Status: DISCONTINUED | OUTPATIENT
Start: 2022-07-09 | End: 2022-07-11 | Stop reason: HOSPADM

## 2022-07-09 RX ORDER — HYDROXYZINE PAMOATE 25 MG/1
50 CAPSULE ORAL ONCE
Status: COMPLETED | OUTPATIENT
Start: 2022-07-09 | End: 2022-07-09

## 2022-07-09 RX ORDER — ATORVASTATIN CALCIUM 40 MG/1
40 TABLET, FILM COATED ORAL NIGHTLY
Status: DISCONTINUED | OUTPATIENT
Start: 2022-07-09 | End: 2022-07-11 | Stop reason: HOSPADM

## 2022-07-09 RX ORDER — SENNA PLUS 8.6 MG/1
1 TABLET ORAL 2 TIMES DAILY
Status: DISCONTINUED | OUTPATIENT
Start: 2022-07-09 | End: 2022-07-11 | Stop reason: HOSPADM

## 2022-07-09 RX ORDER — METOPROLOL SUCCINATE 25 MG/1
25 TABLET, EXTENDED RELEASE ORAL DAILY
Status: DISCONTINUED | OUTPATIENT
Start: 2022-07-09 | End: 2022-07-11 | Stop reason: HOSPADM

## 2022-07-09 RX ORDER — BUSPIRONE HYDROCHLORIDE 5 MG/1
10 TABLET ORAL 2 TIMES DAILY
Status: DISCONTINUED | OUTPATIENT
Start: 2022-07-09 | End: 2022-07-11 | Stop reason: HOSPADM

## 2022-07-09 RX ADMIN — ENOXAPARIN SODIUM 40 MG: 100 INJECTION SUBCUTANEOUS at 08:08

## 2022-07-09 RX ADMIN — Medication 10 ML: at 21:10

## 2022-07-09 RX ADMIN — TRIAMTERENE AND HYDROCHLOROTHIAZIDE 1 TABLET: 37.5; 25 TABLET ORAL at 08:03

## 2022-07-09 RX ADMIN — PANTOPRAZOLE SODIUM 40 MG: 40 TABLET, DELAYED RELEASE ORAL at 08:02

## 2022-07-09 RX ADMIN — ACETAMINOPHEN 650 MG: 325 TABLET ORAL at 13:23

## 2022-07-09 RX ADMIN — ACETAMINOPHEN 650 MG: 325 TABLET ORAL at 08:03

## 2022-07-09 RX ADMIN — BUSPIRONE HYDROCHLORIDE 10 MG: 5 TABLET ORAL at 13:23

## 2022-07-09 RX ADMIN — BUSPIRONE HYDROCHLORIDE 10 MG: 5 TABLET ORAL at 21:10

## 2022-07-09 RX ADMIN — ALUMINUM HYDROXIDE, MAGNESIUM HYDROXIDE, AND SIMETHICONE: 200; 200; 20 SUSPENSION ORAL at 13:23

## 2022-07-09 RX ADMIN — ATORVASTATIN CALCIUM 40 MG: 40 TABLET, FILM COATED ORAL at 21:10

## 2022-07-09 RX ADMIN — Medication 10 ML: at 08:10

## 2022-07-09 RX ADMIN — OXYCODONE 5 MG: 5 TABLET ORAL at 22:22

## 2022-07-09 RX ADMIN — CLONAZEPAM 0.5 MG: 0.5 TABLET ORAL at 18:12

## 2022-07-09 RX ADMIN — LORAZEPAM 0.5 MG: 0.5 TABLET ORAL at 00:08

## 2022-07-09 RX ADMIN — POLYETHYLENE GLYCOL 3350 17 G: 17 POWDER, FOR SOLUTION ORAL at 08:02

## 2022-07-09 RX ADMIN — HYDROXYZINE PAMOATE 50 MG: 25 CAPSULE ORAL at 08:08

## 2022-07-09 RX ADMIN — SENNOSIDES 8.6 MG: 8.6 TABLET, COATED ORAL at 21:10

## 2022-07-09 RX ADMIN — CLONAZEPAM 0.5 MG: 0.5 TABLET ORAL at 08:03

## 2022-07-09 RX ADMIN — ASPIRIN 81 MG: 81 TABLET, CHEWABLE ORAL at 17:30

## 2022-07-09 RX ADMIN — METOPROLOL SUCCINATE 25 MG: 25 TABLET, EXTENDED RELEASE ORAL at 17:30

## 2022-07-09 RX ADMIN — SENNOSIDES 8.6 MG: 8.6 TABLET, COATED ORAL at 13:23

## 2022-07-09 RX ADMIN — VENLAFAXINE HYDROCHLORIDE 300 MG: 150 CAPSULE, EXTENDED RELEASE ORAL at 21:09

## 2022-07-09 RX ADMIN — ACETAMINOPHEN 650 MG: 325 TABLET ORAL at 21:10

## 2022-07-09 ASSESSMENT — PAIN DESCRIPTION - LOCATION: LOCATION: LEG;KNEE

## 2022-07-09 ASSESSMENT — PAIN SCALES - GENERAL
PAINLEVEL_OUTOF10: 4
PAINLEVEL_OUTOF10: 2
PAINLEVEL_OUTOF10: 2
PAINLEVEL_OUTOF10: 3
PAINLEVEL_OUTOF10: 4

## 2022-07-09 ASSESSMENT — PAIN DESCRIPTION - DESCRIPTORS: DESCRIPTORS: DISCOMFORT

## 2022-07-09 ASSESSMENT — PAIN SCALES - WONG BAKER: WONGBAKER_NUMERICALRESPONSE: 0

## 2022-07-09 ASSESSMENT — PAIN - FUNCTIONAL ASSESSMENT: PAIN_FUNCTIONAL_ASSESSMENT: ACTIVITIES ARE NOT PREVENTED

## 2022-07-09 ASSESSMENT — PAIN DESCRIPTION - ORIENTATION: ORIENTATION: RIGHT

## 2022-07-09 NOTE — PROGRESS NOTES
1411 -call placed to cardiology regarding consult  RE: Chest pain, new EKG findings, anterior ST depression

## 2022-07-09 NOTE — PROGRESS NOTES
Physical Therapy  Facility/Department: [facility] C5 - MED SURG/ORTHO  Daily Treatment Note  NAME: Danielle Pritchett  : 1970  MRN: [id number]    Date of Service: 2022    Discharge Recommendations:  Subacute/Skilled Nursing Facility   PT Equipment Recommendations  Equipment Needed: No (defer to SNF)     AM-[facility] Mobility Inpatient   How much difficulty turning over in bed?: A Little  How much difficulty sitting down on / standing up from a chair with arms?: A Little  How much difficulty moving from lying on back to sitting on side of bed?: A Little  How much help from another person moving to and from a bed to a chair?: A Little  How much help from another person needed to walk in hospital room?: A Little  How much help from another person for climbing 3-5 steps with a railing?: A Lot  AM-PAC Inpatient Mobility Raw Score : 17  AM-PAC Inpatient T-Scale Score : 42.13  Mobility Inpatient CMS 0-100% Score: 50.57  Mobility Inpatient CMS G-Code Modifier : CK    Patient Diagnosis(es): The encounter diagnosis was Status post total right knee replacement. Assessment   Assessment: Pt progressing to A of 1 today. Needed max encouragment and verbal cues for anxiety management. Pt demos SBA supine to sit, min A STS and to amb with RW 15'. Pt participates well with supine exs and is recommended for con't skilled PT and SNF at D/C. Activity Tolerance: Patient tolerated treatment well;Patient limited by pain  Equipment Needed: No (defer to SNF)     Plan    Plan  Plan: 2 times a day 7 days a week  Current Treatment Recommendations: Strengthening;Balance training;Functional mobility training;Transfer training; Endurance training;Stair training;Gait training;Neuromuscular re-education;Pain management;Home exercise program;Safety education & training;Patient/Caregiver education & training; Therapeutic activities; Equipment evaluation, education, & procurement     Restrictions  Restrictions/Precautions  Restrictions/Precautions: Weight Bearing,Fall Risk,Surgical Protocols  Required Braces or Orthoses?: Yes  Lower Extremity Weight Bearing Restrictions  Right Lower Extremity Weight Bearing: Weight Bearing As Tolerated  Required Braces or Orthoses  Right Lower Extremity Brace: Knee Immobilizer     Subjective    Subjective  Subjective: Pt agrees to PT, reports having additional anxiety meds today  Pain: 6-7/10  Orientation  Overall Orientation Status: Within Normal Limits  Orientation Level: Oriented X4  Cognition  Overall Cognitive Status: Exceptions  Arousal/Alertness: Inconsistent responses to stimuli (very anious at times, other times very flat affect)     Objective   Vitals:   Vitals:    07/09/22    BP: (!) 145/84   Pulse: 97   Resp:    Temp:    SpO2: 94%          Bed Mobility Training  Bed Mobility Training: Yes  Interventions: Verbal cues; Tactile cues  Supine to Sit: Stand-by assistance; Additional time (HOB elevated use of rails)  Scooting: Stand-by assistance; Additional time (to scoot to EOB)    Balance  Sitting: Intact  Standing: Impaired  Standing - Static: Fair;Occasional  Standing - Dynamic: Fair;Constant support    Transfer Training  Transfer Training: Yes  Sit to Stand: Minimum assistance; Additional time; Adaptive equipment (RW)  Stand to Sit: Minimum assistance; Additional time; Adaptive equipment (RW)  Toilet Transfer: Minimum assistance; Additional time; Adaptive equipment (grab bars)    Gait Training  Gait Training: Yes  Gait  Overall Level of Assistance: Minimum assistance;Contact-guard assistance  Interventions: Weight shifting training/pressure relief;Verbal cues  Base of Support: Widened  Speed/Francoise: Slow  Step Length: Left shortened;Right shortened  Gait Abnormalities: Decreased step clearance  Distance (ft): 15 Feet (X 2 to commode and back)  Assistive Device: Walker, rolling     PT Exercises  Exercise Treatment: supien exs: BLE APX 15, BLE QS X 10, BLE GS X 10, LLE SLR with A x 10, LLE abd with A X 10.   Seated heelsides with pillowcase X 5 with increased pain and anxiety ROM ~ 70* flexion     Safety Devices  Type of Devices: Call light within reach;Gait belt;Nurse notified; Patient at risk for falls; Left in chair;Chair alarm in place       Goals  Short Term Goals  Time Frame for Short term goals: 7 days (7/14/22) unless otherwise stated  Short term goal 1: Pt will perform bed mobility with min(A)  -7/08 met; N goal: mod indep  Short term goal 2: Pt will perform transfer with RW and min(A)  -7/08 min2  Short term goal 3: Pt will ambulate 25 ft with RW and min(A)  -7/08 4; rw mod1+min1  Short term goal 4: Pt will perform 10 reps of BLE exercises to improve strength and mobility by 7/8/22  -7/08 initiated  Patient Goals   Patient goals : \"to transfer to a chair\" -7/08 met    Education  Patient Education  Education Given To: Patient  Education Provided: Role of Therapy;Plan of Care;Transfer Training  Education Method: Verbal;Printed Information/Hand-outs  Barriers to Learning: None  Education Outcome: Verbalized understanding;Demonstrated understanding    Therapy Time   Individual Concurrent Group Co-treatment   Time In 1125         Time Out 1205         Minutes [address] [facility], [address] [place] [address]

## 2022-07-09 NOTE — PROGRESS NOTES
Physical Therapy  Facility/Department: [facility] C5 - MED SURG/ORTHO  Daily Treatment Note  NAME: Jt Tamayo  : 1970  MRN: [id number]    Date of Service: 2022    Discharge Recommendations:  Subacute/Skilled Nursing Facility   PT Equipment Recommendations  Equipment Needed: No (defer to SNF)     AM-[facility] Mobility Inpatient   How much difficulty turning over in bed?: A Little  How much difficulty sitting down on / standing up from a chair with arms?: A Little  How much difficulty moving from lying on back to sitting on side of bed?: A Little  How much help from another person moving to and from a bed to a chair?: A Little  How much help from another person needed to walk in hospital room?: A Little  How much help from another person for climbing 3-5 steps with a railing?: A Lot  AM-PAC Inpatient Mobility Raw Score : 17  AM-PAC Inpatient T-Scale Score : 42.13  Mobility Inpatient CMS 0-100% Score: 50.57  Mobility Inpatient CMS G-Code Modifier : CK    Patient Diagnosis(es): The encounter diagnosis was Status post total right knee replacement. Assessment   Assessment: Pt progressing to A of 1 today. Needed max encouragment and verbal cues for aniety management. Pt demos Whit sit to supine, min A STS and to amb with RW 15'. Pt participates well with supine exs and is recommended for con't skilled PT and SNF at D/C. Activity Tolerance: Patient tolerated treatment well;Patient limited by pain  Equipment Needed: No (defer to SNF)     Plan    Plan  Plan: 2 times a day 7 days a week  Current Treatment Recommendations: Strengthening;Balance training;Functional mobility training;Transfer training; Endurance training;Stair training;Gait training;Neuromuscular re-education;Pain management;Home exercise program;Safety education & training;Patient/Caregiver education & training; Therapeutic activities; Equipment evaluation, education, & procurement     Restrictions  Restrictions/Precautions  Restrictions/Precautions: Weight Bearing,Fall Risk,Surgical Protocols  Required Braces or Orthoses?: Yes  Lower Extremity Weight Bearing Restrictions  Right Lower Extremity Weight Bearing: Weight Bearing As Tolerated  Required Braces or Orthoses  Right Lower Extremity Brace: Knee Immobilizer     Subjective    Subjective  Subjective: Pt agrees to PT, reports having additional anxiety meds today  Pain: 6-7/10  Orientation  Overall Orientation Status: Within Normal Limits  Orientation Level: Oriented X4  Cognition  Overall Cognitive Status: Exceptions  Arousal/Alertness: Inconsistent responses to stimuli (very anious at times, other times very flat affect)     Objective   Vitals:  Vitals:    07/09/22    BP: (!) 145/84   Pulse: 97   Resp:    Temp:    SpO2: 94%          Bed Mobility Training  Bed Mobility Training: Yes  Interventions: Verbal cues; Tactile cues  Sit to Supine: Minimum assistance (RLE only)    Balance  Sitting: Intact  Standing: Impaired  Standing - Static: Fair;Occasional  Standing - Dynamic: Fair;Constant support    Transfer Training  Transfer Training: Yes  Sit to Stand: Minimum assistance; Additional time; Adaptive equipment (RW)  Stand to Sit: Minimum assistance; Additional time; Adaptive equipment (RW)    Gait Training  Gait Training: Yes  Gait  Overall Level of Assistance: Minimum assistance;Contact-guard assistance  Interventions: Weight shifting training/pressure relief;Verbal cues  Base of Support: Widened  Speed/Francoise: Slow  Step Length: Left shortened;Right shortened  Gait Abnormalities: Decreased step clearance  Distance (ft): 15 Feet  Assistive Device: Walker, rolling     PT Exercises  Exercise Treatment: supine exs: BLE AP X 15, BLE QS X 10, BLE GS X 10, LLE SLR with A x 10, LLE abd with A X 10. AROM for flex ~ 70*     Safety Devices  Type of Devices: Call light within reach;Gait belt;Nurse notified; Patient at risk for falls; Left in chair;Chair alarm in place       Goals  Short Term Goals  Time Frame for Short term goals: 7 days (7/14/22) unless otherwise stated  Short term goal 1: Pt will perform bed mobility with min(A)  -7/08 met; N goal: mod indep 7/9 progressing SBA/Whit  Short term goal 2: Pt will perform transfer with RW and min(A)  -7/08 min2 7/9minA, met  Short term goal 3: Pt will ambulate 25 ft with RW and min(A)  -7/08 4; rw mod1+min1 7/9 RW Whit X 15'  Short term goal 4: Pt will perform 10 reps of BLE exercises to improve strength and mobility by 7/8/22  -7/08 initiated 7/9 met  Patient Goals   Patient goals : \"to transfer to a chair\" -7/08 met    Education  Patient Education  Education Given To: Patient  Education Provided: Role of Therapy;Plan of Care;Transfer Training  Education Method: Verbal;Printed Information/Hand-outs  Barriers to Learning: None  Education Outcome: Verbalized understanding;Demonstrated understanding    Therapy Time   Individual Concurrent Group Co-treatment   Time In 1305         Time Out 1330         Minutes [id number], [id number]

## 2022-07-09 NOTE — H&P
Hospital Medicine  Consult History & Physical        Chief Complaint:  Anxiety, chest pain     Date of Service: Pt seen/examined in consultation on 2022    History Of Present Illness:      47 y.o. female who we are asked to see/evaluate by Felicia Cerda MD for medical management of anxiety and chest pain. Patient underwent R TKA on 2022. Patient has been having increasing anxiety with chest pain over the past day. She states that her chest pain feels deep and radiates down her left chest wall under her left breast. She states the pain subsides at rest. Does not radiate to her jaw or arms. Does report severe anxiety at baseline. Does have a family history of heart disease. Will obtain STAT troponin and EKG. Denies fever, chills, shortness of breath, abdominal pain, urinary symptoms, or focal neurologic deficits. Past Medical History:        Diagnosis Date    Anxiety     severe    GERD (gastroesophageal reflux disease)     Hiatal hernia     Hypertension     Nausea & vomiting     Obesities, morbid (HCC)     PONV (postoperative nausea and vomiting)        Past Surgical History:        Procedure Laterality Date    ANKLE SURGERY      left ankle plate r/t fracture     SECTION      KNEE SURGERY      left knee ligament transplant    TOTAL KNEE ARTHROPLASTY Left 10/14/14    Left Total Knee left    WISDOM TOOTH EXTRACTION         Medications Prior to Admission:    Prior to Admission medications    Medication Sig Start Date End Date Taking? Authorizing Provider   oxyCODONE-acetaminophen (PERCOCET) 7.5-325 MG per tablet Take 1 tablet by mouth every 4 hours as needed for Pain for up to 7 days.  S/P Total Joint 22 Yes RAYSA Goldstein   apixaban (ELIQUIS) 2.5 MG TABS tablet Take 1 tablet by mouth 2 times daily for 14 days 22 Yes RAYSA Goldstein   losartan (COZAAR) 25 MG tablet Take 25 mg by mouth daily 22  Yes Historical Provider, MD   raNITIdine HCl (RANITIDINE 75 PO) Take by mouth in the morning and at bedtime     Historical Provider, MD   clonazePAM (KLONOPIN) 0.5 MG tablet Take 1 tablet by mouth 3 times daily. q4-6 hours prn throughout the day 2/18/14   Ronney Spatz, MD   [facility]) 100 MG tablet Take 4.5 tablets by mouth daily. Patient taking differently: Take 300 mg by mouth daily. Taking 300 mg daily at this time 2/18/14   Ronney Spatz, MD   triamterene-hydrochlorothiazide (MAXZIDE-25) 37.5-25 MG per tablet Take 1 tablet by mouth daily. Historical Provider, MD   Potassium Chloride [place] CR (KLOR-CON M20 PO) Take 1 tablet by mouth 2 times daily. Historical Provider, MD       Allergies:  Patient has no known allergies. Social History:      The patient currently lives home     TOBACCO:   reports that she has never smoked. She has never used smokeless tobacco.  ETOH:   reports no history of alcohol use. Family History:     Reviewed in detail and negative for DM, CAD, Cancer, CVA. Positive as follows:        Problem Relation Age of Onset    High Blood Pressure Mother     Heart Disease Father     High Blood Pressure Father        REVIEW OF SYSTEMS COMPLETED:   Pertinent positives as noted in the HPI. All other systems reviewed and negative. PHYSICAL EXAM PERFORMED:  BP (!) 145/84   Pulse 97   Temp 98.8 °F (37.1 °C) (Oral)   Resp 17   Ht 5' 2\" (1.575 m)   Wt 280 lb (127 kg)   LMP 10/31/2017 (Approximate)   SpO2 94%   PF (!) 18 L/min   BMI 51.21 kg/m²   General appearance: No apparent distress, appears stated age and cooperative. HEENT: Normal cephalic, atraumatic without obvious deformity. Pupils equal, round, and reactive to light. Extra ocular muscles intact. Conjunctivae/corneas clear. Neck: Supple, with full range of motion. No jugular venous distention. Trachea midline. Respiratory:  Normal respiratory effort. Clear to auscultation, bilaterally without Rales/Wheezes/Rhonchi.   Cardiovascular: Regular rate and rhythm with normal S1/S2 without murmurs, rubs or gallops. Abdomen: Soft, non-tender, non-distended with normal bowel sounds. Musculoskeletal: RLE surgical incision c/d/i, decreased PROM and AROM. Sensation intact, DP and PT pulses 2+. No clubbing, cyanosis or edema bilaterally. Skin: Skin color, texture, turgor normal.  No rashes or lesions. Neurologic:  Neurovascularly intact without any focal sensory/motor deficits. Cranial nerves: II-XII intact, grossly non-focal.  Psychiatric: Alert and oriented, thought content appropriate, normal insight  Capillary Refill: Brisk,3 seconds, normal   Peripheral Pulses: +2 palpable, equal bilaterally     Labs:     Recent Labs     07/08/22  0537 07/09/22  0537   WBC 10.4 12.8*   HGB 12.4 11.6*   HCT 37.3 35.3*    221     Recent Labs     07/08/22  0536      K 4.0      CO2 27   BUN 18   CREATININE 1.0   CALCIUM 8.6     No results for input(s): AST, ALT, BILIDIR, BILITOT, ALKPHOS in the last 72 hours. No results for input(s): INR in the last 72 hours. No results for input(s): Kathyrn Belch in the last 72 hours. Urinalysis:    Lab Results   Component Value Date/Time    NITRU Negative 09/15/2021 10:50 AM    WBCUA 0-2 10/02/2019 02:48 AM    RBCUA 3-5 10/02/2019 02:48 AM    BLOODU Negative 09/15/2021 10:50 AM    SPECGRAV >=1.030 09/15/2021 10:50 AM    GLUCOSEU Negative 09/15/2021 10:50 AM       Radiology: I have reviewed the radiology reports with the following interpretation:     XR KNEE RIGHT (1-2 VIEWS)   Final Result   Interval postsurgical changes with satisfactory osseous alignment.                 EKG:  I have reviewed the EKG with the following interpretation:    @ekgread@      ASSESSMENT:    Active Hospital Problems    Diagnosis Date Noted    Status post total right knee replacement [Z96.651] 07/07/2022     Priority: Medium    Post-operative pain [G89.18] 07/07/2022     Priority: Medium       PLAN:    Right knee OA s/p R TKA   POD 2, WBAT, PT/OT recommending SNF, pain control with bowel regimen. NVI. DVT ppx with Lovenox     Chest pain with atypical features  Patient reports chest pain is intermittent, trend serial troponins, EKG with new ST depression in anterior leads, cardiology consulted appreciate recs , ASA and statin, continuous telemetry. Discussed etiologies of GERD and MSK pain. Generalized anxiety  Continue home Effexor and clonazepam prn, restarted home Buspar, likely contributing to chest pain as well    GERD  Continue home Ranitidine     HTN, uncontrolled  -Continue home medication    Morbid Obesity -  With Body mass index is 51.21 kg/m². Complicating assessment and treatment. Placing patient at risk for multiple co-morbidities as well as early death and contributing to the patient's presentation. Counseled on weight loss. DVT Prophylaxis: Lovenox   Diet: ADULT DIET;  Regular  Code Status: Prior    PT/OT Eval Status: Active and ongoing    Dispo - 1-2 days     Thank you for the consultation, will follow up as needed    Electronically signed by Elverna Aase, PA on 7/9/22 at 11:56 AM EDT

## 2022-07-09 NOTE — PROGRESS NOTES
Occupational Therapy  Facility/Department: [place] [address] - MED SURG/ORTHO  Occupational Therapy Daily Treatment Note    Name: Aleida Yu  : 1970  MRN: [id number]  Date of Service: 2022    Discharge Recommendations:  [address]      Patient Diagnosis(es): The encounter diagnosis was Status post total right knee replacement. Past Medical History:  has a past medical history of Anxiety, GERD (gastroesophageal reflux disease), Hiatal hernia, Hypertension, Nausea & vomiting, Obesities, morbid ([address] and PONV (postoperative nausea and vomiting). Past Surgical History:  has a past surgical history that includes knee surgery; Ankle surgery;  section; [place] tooth extraction; and Total knee arthroplasty (Left, 10/14/14). Assessment   Performance deficits / Impairments: Decreased functional mobility ; Decreased ADL status; Decreased strength;Decreased endurance;Decreased balance;Decreased coordination  Assessment: Pt participated in ADLs and mobility. She required min/mod A for functional transfers and standing balance with RW. Extensive assist provided LE ADLs. She required encouragement to participate d/t tearful, anxious behavior. However, she did demonstrate improved activity tolerance compared to . Recommend SNF for skilled OT to improve function. Cont OT in acute care.   Prognosis: Good  Activity Tolerance  Activity Tolerance: Patient limited by fatigue;Treatment limited secondary to decreased cognition;Patient limited by pain      Plan   Plan  Times per Week: 4-6x/wk  Current Treatment Recommendations: Strengthening,Balance training,Functional mobility training,Endurance training,Safety education & training,Patient/Caregiver education & training,Self-Care / ADL,Equipment evaluation, education, & procurement     Restrictions  Restrictions/Precautions  Restrictions/Precautions: Weight Bearing,Fall Risk,Surgical Protocols  Required Braces or Orthoses?: Yes  Lower Extremity Weight Bearing Restrictions  Right Lower Extremity Weight Bearing: Weight Bearing As Tolerated  Required Braces or Orthoses  Right Lower Extremity Brace: Knee Immobilizer    Subjective   General  Chart Reviewed: Yes  Patient assessed for rehabilitation services?: Yes  Family / Caregiver Present: No  Referring Practitioner: MILAGRO West  Diagnosis: R knee OA s/p R TKR  Subjective  Subjective: Pt agreeable to OT. Reports 3/10 R knee pain. Pt positioned in chair at end of session for comfort. She reports anxiety today. General Comment  Comments: RN approved therapy  6-7/10     Objective   Heart Rate: 97  Heart Rate Source: Monitor  BP: (!) 145/84  BP Location: Left lower arm  BP Method: Automatic  Patient Position: Semi fowlers  MAP (Calculated): 104.33  Resp: 17  SpO2: 94 %  O2 Device: None (Room air)       Safety Devices  Type of Devices: Call light within reach;Gait belt;Nurse notified; Patient at risk for falls; Left in chair;Chair alarm in place  Toilet Transfers  Toilet - Technique: Ambulating (Rw)  Equipment Used: Grab bars  Toilet Transfer: Moderate assistance (assist to control descent)  Toilet Transfers Comments: min/mod A to ambulate to/from BR; max encouragement d/t feeling anxious     ADL  Feeding: Independent  Grooming: Setup  Grooming Skilled Clinical Factors: wash hands while seated  LE Dressing: Maximum assistance to total assist   LE Dressing Skilled Clinical Factors: brief and socks  Toileting: Moderate assistance  Toileting Skilled Clinical Factors: Mod A for brief. Pt managed pericare while seated on toilet. Activity Tolerance  Activity Tolerance: Patient tolerated treatment well;Patient limited by pain  Bed mobility  Supine to Sit: Minimal assistance (HOB elevated, use of siderail, increased time to complete)  Sit to Supine: Unable to assess  Transfers  Stand Pivot Transfers: Minimal assistance; Moderate assistance (RW)  Sit to stand: Minimal assistance  Stand to sit: Minimal assistance  Transfer understanding of safe car transfer technique--goal not met, pt plans to d/c to SNF  Patient Goals   Patient goals : Troy Money to the bathroom on my own\" by 7/11--not met 7/9. Min/mod A for bathroom mobility with RW, vc's for safety       Therapy Time   Individual Concurrent Group Co-treatment   Time In 1138         Time Out 1204         Minutes 26         Timed Code Treatment Minutes: 26 Minutes    If pt is discharged prior to next OT session, this note will serve as the discharge summary.   Yarelis Vogt OT

## 2022-07-09 NOTE — PROGRESS NOTES
Department of Orthopedic Surgery  Physician Assistant   Progress Note        Subjective:  Feeling increasing sadnes and anxiety today. Talking regularly with family. Struggles with anxiety and depression at baseline. Some improvement with chest discomfort with tums, hospitalist consulted for re evaluation. Knee improving      Vitals  VITALS:  BP (!) 145/84   Pulse 97   Temp 98.8 °F (37.1 °C) (Oral)   Resp 17   Ht 5' 2\" (1.575 m)   Wt 280 lb (127 kg)   LMP 10/31/2017 (Approximate)   SpO2 94%   PF (!) 18 L/min   BMI 51.21 kg/m²     PHYSICAL EXAM:    Orientation:  alert and oriented to person, place and time    Right Lower Extremity    Incision:  dressing in place, clean, dry and intact    Lower Extremity Motor :    Moving lower extremities without difficulty today. Able to dorsiflex and plantar flex foot/ankle. Lower Extremity Sensory:   Neurovascularly intact to gross sensation and touch in lower extremities. Pulses:    present 2+ bilaterally lower extremities. Abnormal Exam findings:  none    Brace: Intact and will continue to wear during inpatient stay. LABS:    HgB:    Lab Results   Component Value Date/Time    HGB 11.6 07/09/2022 05:37 AM     INR:  No results found for: PTINR  CBC:   Lab Results   Component Value Date/Time    WBC 12.8 07/09/2022 05:37 AM    RBC 4.23 07/09/2022 05:37 AM    HGB 11.6 07/09/2022 05:37 AM    HCT 35.3 07/09/2022 05:37 AM    MCV 83.4 07/09/2022 05:37 AM    MCH 27.4 07/09/2022 05:37 AM    MCHC 32.9 07/09/2022 05:37 AM    RDW 13.8 07/09/2022 05:37 AM     07/09/2022 05:37 AM    MPV 7.4 07/09/2022 05:37 AM       ASSESSMENT AND PLAN:    Post operative day 2 status post right total knee arthroplasty.     1:  Weight bearing as tolerated  2:  Continue Deep venous thrombosis prophylaxis  3:  Continue physical therapy  4:  D/C Plan:  Awaiting recs from Hospitalist  5:  Continue Pain Control  6: [place] to see patient

## 2022-07-10 LAB
EKG ATRIAL RATE: 89 BPM
EKG DIAGNOSIS: NORMAL
EKG P AXIS: 59 DEGREES
EKG P-R INTERVAL: 140 MS
EKG Q-T INTERVAL: 370 MS
EKG QRS DURATION: 88 MS
EKG QTC CALCULATION (BAZETT): 450 MS
EKG R AXIS: 1 DEGREES
EKG T AXIS: 49 DEGREES
EKG VENTRICULAR RATE: 89 BPM
HCT VFR BLD CALC: 33.4 % (ref 36–48)
HEMOGLOBIN: 10.9 G/DL (ref 12–16)
MCH RBC QN AUTO: 27.7 PG (ref 26–34)
MCHC RBC AUTO-ENTMCNC: 32.8 G/DL (ref 31–36)
MCV RBC AUTO: 84.4 FL (ref 80–100)
PDW BLD-RTO: 13.8 % (ref 12.4–15.4)
PLATELET # BLD: 213 K/UL (ref 135–450)
PMV BLD AUTO: 7.2 FL (ref 5–10.5)
RBC # BLD: 3.95 M/UL (ref 4–5.2)
WBC # BLD: 10.1 K/UL (ref 4–11)

## 2022-07-10 PROCEDURE — 6370000000 HC RX 637 (ALT 250 FOR IP): Performed by: NURSE PRACTITIONER

## 2022-07-10 PROCEDURE — 97530 THERAPEUTIC ACTIVITIES: CPT

## 2022-07-10 PROCEDURE — 6360000002 HC RX W HCPCS: Performed by: PHYSICIAN ASSISTANT

## 2022-07-10 PROCEDURE — 36415 COLL VENOUS BLD VENIPUNCTURE: CPT

## 2022-07-10 PROCEDURE — 6370000000 HC RX 637 (ALT 250 FOR IP): Performed by: PHYSICIAN ASSISTANT

## 2022-07-10 PROCEDURE — 6370000000 HC RX 637 (ALT 250 FOR IP): Performed by: INTERNAL MEDICINE

## 2022-07-10 PROCEDURE — 97110 THERAPEUTIC EXERCISES: CPT

## 2022-07-10 PROCEDURE — 97116 GAIT TRAINING THERAPY: CPT

## 2022-07-10 PROCEDURE — 93010 ELECTROCARDIOGRAM REPORT: CPT | Performed by: INTERNAL MEDICINE

## 2022-07-10 PROCEDURE — 93005 ELECTROCARDIOGRAM TRACING: CPT | Performed by: INTERNAL MEDICINE

## 2022-07-10 PROCEDURE — 1200000000 HC SEMI PRIVATE

## 2022-07-10 PROCEDURE — 85027 COMPLETE CBC AUTOMATED: CPT

## 2022-07-10 PROCEDURE — 2580000003 HC RX 258: Performed by: PHYSICIAN ASSISTANT

## 2022-07-10 PROCEDURE — 99222 1ST HOSP IP/OBS MODERATE 55: CPT | Performed by: INTERNAL MEDICINE

## 2022-07-10 RX ORDER — LACTULOSE 10 G/15ML
20 SOLUTION ORAL ONCE
Status: COMPLETED | OUTPATIENT
Start: 2022-07-10 | End: 2022-07-10

## 2022-07-10 RX ADMIN — LACTULOSE 20 G: 20 SOLUTION ORAL at 14:00

## 2022-07-10 RX ADMIN — BUSPIRONE HYDROCHLORIDE 10 MG: 5 TABLET ORAL at 20:00

## 2022-07-10 RX ADMIN — ASPIRIN 81 MG: 81 TABLET, CHEWABLE ORAL at 08:21

## 2022-07-10 RX ADMIN — ENOXAPARIN SODIUM 40 MG: 100 INJECTION SUBCUTANEOUS at 08:22

## 2022-07-10 RX ADMIN — OXYCODONE 5 MG: 5 TABLET ORAL at 04:13

## 2022-07-10 RX ADMIN — CLONAZEPAM 0.5 MG: 0.5 TABLET ORAL at 08:22

## 2022-07-10 RX ADMIN — POLYETHYLENE GLYCOL 3350 17 G: 17 POWDER, FOR SOLUTION ORAL at 08:22

## 2022-07-10 RX ADMIN — ACETAMINOPHEN 650 MG: 325 TABLET ORAL at 14:00

## 2022-07-10 RX ADMIN — VENLAFAXINE HYDROCHLORIDE 300 MG: 150 CAPSULE, EXTENDED RELEASE ORAL at 20:00

## 2022-07-10 RX ADMIN — PANTOPRAZOLE SODIUM 40 MG: 40 TABLET, DELAYED RELEASE ORAL at 06:44

## 2022-07-10 RX ADMIN — ACETAMINOPHEN 650 MG: 325 TABLET ORAL at 20:00

## 2022-07-10 RX ADMIN — SENNOSIDES 8.6 MG: 8.6 TABLET, COATED ORAL at 08:21

## 2022-07-10 RX ADMIN — BUSPIRONE HYDROCHLORIDE 10 MG: 5 TABLET ORAL at 08:21

## 2022-07-10 RX ADMIN — ACETAMINOPHEN 650 MG: 325 TABLET ORAL at 08:21

## 2022-07-10 RX ADMIN — Medication 10 ML: at 08:27

## 2022-07-10 RX ADMIN — ACETAMINOPHEN 650 MG: 325 TABLET ORAL at 02:50

## 2022-07-10 RX ADMIN — METOPROLOL SUCCINATE 25 MG: 25 TABLET, EXTENDED RELEASE ORAL at 08:21

## 2022-07-10 RX ADMIN — ATORVASTATIN CALCIUM 40 MG: 40 TABLET, FILM COATED ORAL at 20:00

## 2022-07-10 RX ADMIN — TRIAMTERENE AND HYDROCHLOROTHIAZIDE 1 TABLET: 37.5; 25 TABLET ORAL at 08:22

## 2022-07-10 RX ADMIN — Medication 10 ML: at 20:01

## 2022-07-10 RX ADMIN — CLONAZEPAM 0.5 MG: 0.5 TABLET ORAL at 16:56

## 2022-07-10 ASSESSMENT — PAIN SCALES - GENERAL
PAINLEVEL_OUTOF10: 3
PAINLEVEL_OUTOF10: 4
PAINLEVEL_OUTOF10: 0
PAINLEVEL_OUTOF10: 0

## 2022-07-10 NOTE — PROGRESS NOTES
Hospitalist Progress Note      PCP: Valerio Wilkinson    Date of Admission: 7/7/2022    Chief Complaint:   Chest pain     Hospital Course:      47 y.o. female who we are asked to see/evaluate by Lauren Young MD for medical management of anxiety and chest pain. Patient underwent R TKA on 7/7/2022. Patient has been having increasing anxiety with chest pain over the past day. She states that her chest pain feels deep and radiates down her left chest wall under her left breast. She states the pain subsides at rest. Does not radiate to her jaw or arms. Does report severe anxiety at baseline. Does have a family history of heart disease. Will obtain STAT troponin and EKG. Denies fever, chills, shortness of breath, abdominal pain, urinary symptoms, or focal neurologic deficits. Subjective:   Seen resting in bed comfortably, states no episodes of chest pain overnight or this morning, states anxiety feels improved. Pain controlled. VSS. Afebrile.         Medications:  Reviewed    Infusion Medications    sodium chloride       Scheduled Medications    busPIRone  10 mg Oral BID    senna  1 tablet Oral BID    aspirin  81 mg Oral Daily    atorvastatin  40 mg Oral Nightly    metoprolol succinate  25 mg Oral Daily    pantoprazole  40 mg Oral QAM AC    sodium chloride flush  5-40 mL IntraVENous 2 times per day    acetaminophen  650 mg Oral Q6H    polyethylene glycol  17 g Oral Daily    enoxaparin  40 mg SubCUTAneous Daily    triamterene-hydroCHLOROthiazide  1 tablet Oral Daily    venlafaxine  300 mg Oral Nightly     PRN Meds: clonazePAM, calcium carbonate, sodium chloride flush, sodium chloride, morphine **OR** morphine, oxyCODONE **OR** oxyCODONE, HYDROmorphone **OR** HYDROmorphone    No intake or output data in the 24 hours ending 07/10/22 1620    Physical Exam Performed:    /83   Pulse 95   Temp 98.3 °F (36.8 °C) (Oral)   Resp 16   Ht 5' 2\" (1.575 m)   Wt 280 lb (127 kg)   LMP 10/31/2017 (Approximate)   SpO2 95%   PF (!) 18 L/min   BMI 51.21 kg/m²     General appearance: No apparent distress, appears stated age and cooperative. HEENT: Pupils equal, round, and reactive to light. Conjunctivae/corneas clear. Neck: Supple, with full range of motion. No jugular venous distention. Trachea midline. Respiratory:  Normal respiratory effort. Clear to auscultation, bilaterally without Rales/Wheezes/Rhonchi. Cardiovascular: Regular rate and rhythm with normal S1/S2 without murmurs, rubs or gallops. Abdomen: Soft, non-tender, non-distended with normal bowel sounds. Musculoskeletal: No strikethrough noted with surgical dressing. Sensation intact. DP and PT pulses 2+. No clubbing, cyanosis or edema bilaterally. Full range of motion without deformity. Skin: Skin color, texture, turgor normal.  No rashes or lesions. Neurologic:  Neurovascularly intact without any focal sensory/motor deficits. Cranial nerves: II-XII intact, grossly non-focal.  Psychiatric: Alert and oriented, thought content appropriate, normal insight  Capillary Refill: Brisk,3 seconds, normal   Peripheral Pulses: +2 palpable, equal bilaterally       Labs:   Recent Labs     07/08/22  0537 07/09/22  0537 07/10/22  0644   WBC 10.4 12.8* 10.1   HGB 12.4 11.6* 10.9*   HCT 37.3 35.3* 33.4*    221 213     Recent Labs     07/08/22  0536      K 4.0      CO2 27   BUN 18   CREATININE 1.0   CALCIUM 8.6     No results for input(s): AST, ALT, BILIDIR, BILITOT, ALKPHOS in the last 72 hours. No results for input(s): INR in the last 72 hours.   Recent Labs     07/09/22  1229 07/09/22  1542   TROPONINI <0.01 <0.01       Urinalysis:      Lab Results   Component Value Date/Time    NITRU Negative 09/15/2021 10:50 AM    WBCUA 0-2 10/02/2019 02:48 AM    RBCUA 3-5 10/02/2019 02:48 AM    BLOODU Negative 09/15/2021 10:50 AM    SPECGRAV >=1.030 09/15/2021 10:50 AM    GLUCOSEU Negative 09/15/2021 10:50 AM       Radiology:  XR KNEE RIGHT (1-2 VIEWS)   Final Result   Interval postsurgical changes with satisfactory osseous alignment. Consults  IP CONSULT TO HOSPITALIST  IP CONSULT TO SOCIAL WORK  IP CONSULT TO HOSPITALIST  IP CONSULT TO CARDIOLOGY      Assessment/Plan:    Active Hospital Problems    Diagnosis     Other chest pain [R07.89]      Priority: Medium    Status post total right knee replacement [Z96.651]      Priority: Medium    Post-operative pain [G89.18]      Priority: Medium       Right knee OA s/p R TKA   POD 2, WBAT, PT/OT recommending SNF, pain control with bowel regimen. NVI. DVT ppx with Lovenox      Chest pain with atypical features  Patient reports chest pain is intermittent, Serial troponins nonacute, EKG with new ST depression in anterior leads, cardiology consulted appreciate recs , ASA and statin, continuous telemetry. Discussed etiologies of GERD and MSK pain. Repeat EKG this AM unchanged, seen by cardiology and agreed no component of ACS, will follow-up outpatient once recovered from TKA for stress test and echo for risk stratification 2/2 to risk factors.       Generalized anxiety  Continue home Effexor and clonazepam prn, restarted home Buspar, likely contributing to chest pain as well     GERD  Continue home Ranitidine      HTN, uncontrolled  -Continue home medication     Morbid Obesity -  With Body mass index is 51.21 kg/m². Complicating assessment and treatment. Placing patient at risk for multiple co-morbidities as well as early death and contributing to the patient's presentation. Counseled on weight loss. DVT Prophylaxis: Lovenox   Diet: ADULT DIET;  Regular  Code Status: Prior    PT/OT Eval Status: Recommending SNF, [facility]     Dispo - Medical ready for discharge     Marcus Rondon

## 2022-07-10 NOTE — PROGRESS NOTES
Physical Therapy  Facility/Department: [place] [address] - MED SURG/ORTHO  Daily Treatment Note  NAME: Ambreen Mcak  : 1970  MRN: [id number]    Date of Service: 7/10/2022    Discharge Recommendations:  [address] (assuming patient's anxiety is under control and patient is more willing to participate in therapy)   PT Equipment Recommendations  Equipment Needed: No  Other: defer to patient's facility    Patient Diagnosis(es): The encounter diagnosis was Status post total right knee replacement. Assessment   Assessment: Patient made less progress towards her goals during her PM session. Patient initially agreeable to therapy but later stated she only wanted to participate in minimal supine exercises. Patient's anxiety appeared to much less controlled this afternoon. Patient is still below her prior level of function and would benefit from skilled PT plan of care. PT continues to recommend that patient receive skilled PT in the SNF setting, when medically stable, to address her therapy deficits and to help her maximize her safety and independence with all functional mobility. PT to continue to follow. Activity Tolerance: Patient limited by fatigue;Patient limited by pain; Patient limited by endurance  Equipment Needed: No  Other: defer to patient's facility     Plan    Plan  Plan: 2 times a day 7 days a week  Specific Instructions for Next Treatment: progress mobility as tolerated  Current Treatment Recommendations: Strengthening;Balance training;Functional mobility training;Transfer training; Endurance training;Stair training;Gait training;Neuromuscular re-education;Pain management;Home exercise program;Safety education & training;Patient/Caregiver education & training; Therapeutic activities; Equipment evaluation, education, & procurement     Restrictions  Restrictions/Precautions  Restrictions/Precautions: Weight Bearing,Fall Risk,Surgical Protocols  Required Braces or Orthoses?: Yes  Lower (patient's mother at bedside)       Goals  Short Term Goals  Time Frame for Short term goals: 7 days (7/14/22) unless otherwise stated  Short term goal 1: Pt will perform bed mobility with min(A)  -7/08 met; New goal: mod indep 7/10 PM patient refused to attempt  Short term goal 2: Pt will perform transfer with RW and min(A)  -7/10 AM CGA with RW 7/10 PM patient refused to attempt  Short term goal 3: Pt will ambulate 25 ft with RW and min(A)  - 7/10 PM patient refused to attempt  Short term goal 4: Pt will perform 10 reps of BLE exercises to improve strength and mobility by 7/8/22  -7/10 AM goal met. 7/10 PM continue goal.  Patient Goals   Patient goals : \"I want to be able short household distances (30 feet) with my walker with a little help (CGA). \" 7/10 PM patient refused to attempt    Education  Patient Education  Education Given To: Patient  Education Provided: Role of Therapy;Plan of Care;Transfer Training  Education Provided Comments: benefits of increased mobility/out of bed mobility, benefits of completing her exercises, use of call bell, safety with rolling walker.   Education Method: Verbal;Demonstration  Barriers to Learning: None  Education Outcome: Unable to demonstrate understanding;Continued education needed    Therapy Time   Individual Concurrent Group Co-treatment   Time In 5350         Time Out 1605         Minutes 11         Timed Code Treatment Minutes: [address] Sundeep Geller, PT

## 2022-07-10 NOTE — CONSULTS
pain         Cardiac Testing: I have reviewed the findings below. EKG:  ECHO:   STRESS TEST:  CATH:  BYPASS:  VASCULAR:    Past Medical History:   has a past medical history of Anxiety, GERD (gastroesophageal reflux disease), Hiatal hernia, Hypertension, Nausea & vomiting, Obesities, morbid ([address] and PONV (postoperative nausea and vomiting). Surgical History:   has a past surgical history that includes knee surgery; Ankle surgery;  section; [place] tooth extraction; and Total knee arthroplasty (Left, 10/14/14). Social History:   reports that she has never smoked. She has never used smokeless tobacco. She reports that she does not drink alcohol and does not use drugs. Family History:  No evidence for sudden cardiac death or premature CAD    Medications:  Reviewed and are listed in nursing record. and/or listed below  Outpatient Medications:  Prior to Admission medications    Medication Sig Start Date End Date Taking? Authorizing Provider   oxyCODONE-acetaminophen (PERCOCET) 7.5-325 MG per tablet Take 1 tablet by mouth every 4 hours as needed for Pain for up to 7 days. S/P Total Joint 22 Yes RAYSA Goldstein   apixaban (ELIQUIS) 2.5 MG TABS tablet Take 1 tablet by mouth 2 times daily for 14 days 22 Yes RAYSA Goldstein   losartan (COZAAR) 25 MG tablet Take 25 mg by mouth daily 22  Yes Historical Provider, MD   raNITIdine HCl (RANITIDINE 75 PO) Take by mouth in the morning and at bedtime     Historical Provider, MD   clonazePAM (KLONOPIN) 0.5 MG tablet Take 1 tablet by mouth 3 times daily. q4-6 hours prn throughout the day 14   Kiki Cordero MD   venlafaxine [facility]) 100 MG tablet Take 4.5 tablets by mouth daily. Patient taking differently: Take 300 mg by mouth daily. Taking 300 mg daily at this time 14   Kiki Cordero MD   triamterene-hydrochlorothiazide (MAXZIDE-25) 37.5-25 MG per tablet Take 1 tablet by mouth daily.       Historical Provider, MD   Potassium Chloride [place] (KLOR-CON M20 PO) Take 1 tablet by mouth 2 times daily. Historical Provider, MD       In-patient schedule medications:   busPIRone  10 mg Oral BID    senna  1 tablet Oral BID    aspirin  81 mg Oral Daily    atorvastatin  40 mg Oral Nightly    metoprolol succinate  25 mg Oral Daily    pantoprazole  40 mg Oral QAM AC    sodium chloride flush  5-40 mL IntraVENous 2 times per day    acetaminophen  650 mg Oral Q6H    polyethylene glycol  17 g Oral Daily    enoxaparin  40 mg SubCUTAneous Daily    triamterene-hydroCHLOROthiazide  1 tablet Oral Daily    venlafaxine  300 mg Oral Nightly         Infusion Medications:   sodium chloride           Allergies:  Patient has no known allergies. Review of Systems:   All 14 point review of symptoms completed. Pertinent positives identified in the HPI, all other review of symptoms findings as below.      Review of Systems - History obtained from the patient  General ROS: negative for - chills, fever or night sweats  Psychological ROS: negative for - disorientation or hallucinations  Ophthalmic ROS: negative for - dry eyes, eye pain or loss of vision  ENT ROS: negative for - nasal discharge or sore throat  Allergy and Immunology ROS: negative for - hives or itchy/watery eyes  Hematological and Lymphatic ROS: negative for - jaundice or night sweats  Endocrine ROS: negative for - mood swings or temperature intolerance  Breast ROS: deferred  Respiratory ROS: negative for - hemoptysis or stridor  Gastrointestinal ROS: no abdominal pain, change in bowel habits, or black or bloody stools  Genito-Urinary ROS: no dysuria, trouble voiding, or hematuria  Musculoskeletal ROS: negative for - gait disturbance, joint pain or joint stiffness  Neurological ROS: negative for - seizures or speech problems  Dermatological ROS: negative for - rash or skin lesion changes      Physical Examination:    [unfilled]  /78   Pulse 88   Temp 98.2 °F (36.8 °C) (Oral)   Resp 17   Ht 5' 2\" (1.575 m)   Wt 280 lb (127 kg)   LMP 10/31/2017 (Approximate)   SpO2 95%   PF (!) 18 L/min   BMI 51.21 kg/m²    Weight: 280 lb (127 kg)     Wt Readings from Last 3 Encounters:   07/07/22 280 lb (127 kg)   03/08/20 285 lb (129.3 kg)   11/10/19 250 lb (113.4 kg)       Intake/Output Summary (Last 24 hours) at 7/10/2022 5732  Last data filed at 7/9/2022 1001  Gross per 24 hour   Intake --   Output 250 ml   Net -250 ml       General Appearance:  Alert, cooperative, no distress, appears stated age   Head:  Normocephalic, without obvious abnormality, atraumatic   Eyes:  PERRL, conjunctiva/corneas clear       Nose: Nares normal, no drainage or sinus tenderness   Throat: Lips, mucosa, and tongue normal   Neck: Supple, symmetrical, trachea midline, no adenopathy, thyroid: not enlarged, symmetric, no tenderness/mass/nodules, no carotid bruit or JVD       Lungs:   Clear to auscultation bilaterally, respirations unlabored   Chest Wall:  No tenderness or deformity   Heart:  Regular rhythm and normal rate; S1, S2 are normal; no murmur noted; no rub or gallop   Abdomen:   Soft, non-tender, bowel sounds active all four quadrants,  no masses, no organomegaly           Extremities: Extremities normal, atraumatic, no cyanosis or edema   Pulses: 2+ and symmetric   Skin: Skin color, texture, turgor normal, no rashes or lesions   Pysch: Normal mood and affect   Neurologic: Normal gross motor and sensory exam.         Labs  Recent Labs     07/09/22  0537 07/10/22  0644   WBC 12.8* 10.1   HGB 11.6* 10.9*   HCT 35.3* 33.4*   MCV 83.4 84.4    213     Recent Labs     07/08/22  0536   CREATININE 1.0   BUN 18      K 4.0      CO2 27     No results for input(s): INR, PROTIME in the last 72 hours. Recent Labs     07/09/22  1229 07/09/22  1542   TROPONINI <0.01 <0.01     Invalid input(s): PRO-BNP  No results for input(s): CHOL, LDL, HDL in the last 72 hours.     Invalid input(s): TG      Imaging:  I have reviewed the below testing personally and my interpretation is below. EKG:  CXR:      Assessment:  47 y.o. patient with:  Chest pain  Active Problems:    Status post total right knee replacement    Plan:  Normal troponin and no longer having symptoms. Will repeat EKG today. Although atypical symptoms, favor getting echocardiogram and stress testing.   ~can be done as OP   No clinical evidence to suggest that the patient's chest pain is an acute coronary syndrome process. Medical Decision Making: The following items were considered in medical decision making:  Independent review of images  Review / order clinical lab tests  Review / order radiology tests  Decision to obtain old records  Review and summation of old records as accessed through [facility] (a summary of my findings in these old records)            All questions and concerns were addressed to the patient/family. Alternatives to my treatment were discussed. The note was completed using EMR. Every effort was made to ensure accuracy; however, inadvertent computerized transcription errors may be present.     Mauricio Stauffer MD, [facility]  [phone] [facility] office  [phone] [facility]  7/10/2022  8:07 AM

## 2022-07-10 NOTE — PROGRESS NOTES
Occupational Therapy  Facility/Department: [facility]  - MED SURG/ORTHO  Treatment Note      Name: Víctor Montanez  : 1970  MRN: [id number]  Date of Service: 7/10/2022    Discharge Recommendations:  [address]          Patient Diagnosis(es): The encounter diagnosis was Status post total right knee replacement. Past Medical History:  has a past medical history of Anxiety, GERD (gastroesophageal reflux disease), Hiatal hernia, Hypertension, Nausea & vomiting, Obesities, morbid ([address] and PONV (postoperative nausea and vomiting). Past Surgical History:  has a past surgical history that includes knee surgery; Ankle surgery;  section; [place] tooth extraction; and Total knee arthroplasty (Left, 10/14/14). Assessment   Performance deficits / Impairments: Decreased functional mobility ; Decreased ADL status; Decreased strength;Decreased endurance;Decreased balance;Decreased coordination  Assessment: pt normally independent with IADL's & functional mobility without AD; pt admitted for Right TKR , now dependent with toileting, min assist with bed mobility & sit-->stand transfers,; pt to benefit from skilled OT services  Activity Tolerance  Activity Tolerance: Patient limited by fatigue        Plan   Plan  Times per Week: 4-6x/wk  Current Treatment Recommendations: Strengthening,Balance training,Functional mobility training,Endurance training,Safety education & training,Patient/Caregiver education & training,Self-Care / ADL,Equipment evaluation, education, & procurement     Restrictions  Restrictions/Precautions  Restrictions/Precautions: Weight Bearing,Fall Risk,Surgical Protocols  Required Braces or Orthoses?: Yes  Lower Extremity Weight Bearing Restrictions  Right Lower Extremity Weight Bearing: Weight Bearing As Tolerated  Required Braces or Orthoses  Right Lower Extremity Brace: Knee Immobilizer  Position Activity Restriction  Other position/activity restrictions: roman    Subjective   General  Chart Reviewed: Yes  Patient assessed for rehabilitation services?: Yes  Family / Caregiver Present: No  Referring Practitioner: MILAGRO West  Diagnosis: R knee OA s/p R TKR  Subjective  Subjective: Pt agreeable to OT. Reports 3/10 R knee pain. Pt positioned in chair at end of session for comfort. She reports anxiety today. General Comment  Comments: RN cleared pt for OT; pt awake in bed, agreeable to therapy  Patient had pain in her right knee but she did not rate it. She said she received medicine prior to therapy. Objective   Heart Rate: 92  Heart Rate Source: Monitor  BP: 135/86  BP Location: Left lower arm  Patient Position: Semi fowlers  MAP (Calculated): 102.33  Resp: 16  SpO2: 95 %  O2 Device: None (Room air)          Observation/Palpation  Posture: Fair  Safety Devices  Type of Devices: All antonio prominences offloaded;Call light within reach; Bed alarm in place; Left in bed          ADL  LE Dressing: Supervision (with use LE AE to doff/renu socks seated EOB)  Toileting: Dependent/Total (Roman,declined bathroom mobility)     Activity Tolerance  Activity Tolerance: Patient tolerated treatment well    Bed mobility  Supine to Sit: Contact guard assistance  Sit to Supine: Minimal assistance  Scooting: Modified independent  Transfers  Stand Step Transfers: Contact guard assistance (with RW to Right to [facility])  Sit to stand: Minimal assistance  Stand to sit: Contact guard assistance     Cognition  Overall Cognitive Status: Exceptions  Arousal/Alertness: Appropriate responses to stimuli  Following Commands:  Follows one step commands consistently  Attention Span: Appears intact  Memory: Decreased recall of precautions  Safety Judgement: Decreased awareness of need for safety  Problem Solving: Decreased awareness of errors  Insights: Decreased awareness of deficits  Initiation: Requires cues for some  Sequencing: Does not require cues  Orientation  Overall Orientation Status: Within Functional Limits  Orientation Level: Oriented X4                  Education Given To: Patient  Education Provided: Role of Therapy;Plan of Care;Precautions  Education Provided Comments: disease specific: importance of OOB to chair for meals & to bathroom with assist; use or RED/nurse call light for assist with transfers & ADL needs  Education Method: Verbal  Barriers to Learning: Cognition (Anxiety, depression)  Education Outcome: Verbalized understanding;Continued education needed           AM-PAC Score        AM-PAC Inpatient Daily Activity Raw Score: 16 (07/10/22 1040)  AM-PAC Inpatient ADL T-Scale Score : 35.96 (07/10/22 1040)  ADL Inpatient CMS 0-100% Score: 53.32 (07/10/22 1040)  ADL Inpatient CMS G-Code Modifier : CK (07/10/22 1040)    Goals  Short Term Goals  Time Frame for Short term goals: 1 week (7/15) unless otherwise specified  Short Term Goal 1: Pt will complete STS transfer to RW w/ CGA-; 7/10 STG met; New STG: CGA with bathroom mobility with RW by 7-15-22  Short Term Goal 2: Pt will complete toileting w/ Whit and Sirena Brixey stedy by 7/11-7/10 remains dependent on Purewick  Short Term Goal 3: Pt will complete 2-3 ADLs standing at sink w/ RW in prep for ADLs-7/10 declined bathroom ADL's due to fatigue  Short Term Goal 4: Pt will tolerate 5min standing at RW while engaging in meanginful activity to improve endurance-ongoing 7/9  Short Term Goal 5: Report understanding of safe car transfer technique--goal not met, pt plans to d/c to SNF  Patient Goals   Patient goals : \"walk to the bathroom on my own\" by 7/11--not met 7/9.  Min/mod A for bathroom mobility with RW, vc's for safety       Therapy Time   Individual Concurrent Group Co-treatment   Time In 1008         Time Out 1025         Minutes [id number] [place], [place]

## 2022-07-10 NOTE — PROGRESS NOTES
Physical Therapy  Facility/Department: [facility] - MED SURG/ORTHO  Physical Therapy Treatment    Name: Chaim Ingram  : 1970  MRN: [id number]  Date of Service: 7/10/2022    Discharge Recommendations:  Subacute/Skilled Nursing Facility   PT Equipment Recommendations  Equipment Needed: No  Other: defer to patient's facility      Patient Diagnosis(es): The encounter diagnosis was Status post total right knee replacement. Past Medical History:  has a past medical history of Anxiety, GERD (gastroesophageal reflux disease), Hiatal hernia, Hypertension, Nausea & vomiting, Obesities, morbid ([address] and PONV (postoperative nausea and vomiting). Past Surgical History:  has a past surgical history that includes knee surgery; Ankle surgery;  section; [place] tooth extraction; and Total knee arthroplasty (Left, 10/14/14). Assessment   Body Structures, Functions, Activity Limitations Requiring Skilled Therapeutic Intervention: Decreased functional mobility ; Decreased ADL status; Decreased ROM; Decreased strength;Decreased safe awareness;Decreased balance;Decreased endurance; Increased pain  Assessment: Patient is making good progress with her therapy goals. However during today's AM session the patient continued to need SBA for bed mobility, CGA for transfers and min assist to ambulate up to 20 feet with a rolling walker. She completed her exercises with guidance. Patient's anxiety today appeared to be much more under control today. Patient is still below her prior level of function and would benefit from skilled PT plan of care. PT continues to recommend that patient receive skilled PT in the SNF setting, when medically stable, to address her therapy deficits and to help her maximize her safety and independence with all functional mobility. PT to continue to follow.   Treatment Diagnosis: impaired functional mobility  Specific Instructions for Next Treatment: progress mobility as tolerated  Therapy Prognosis: Good  Decision Making: Medium Complexity  Barriers to Learning: anxiety  Requires PT Follow-Up: Yes  Activity Tolerance  Activity Tolerance: Patient tolerated treatment well  Activity Tolerance Comments: Vitals: 117/79 96 BPM 94% on room air. Plan   Plan  Plan: 2 times a day 7 days a week  Specific Instructions for Next Treatment: progress mobility as tolerated  Current Treatment Recommendations: Strengthening,Balance training,Functional mobility training,Transfer training,Endurance training,Stair training,Gait training,Neuromuscular re-education,Pain management,Home exercise program,Safety education & training,Patient/Caregiver education & training,Therapeutic activities,Equipment evaluation, education, & procurement  Safety Devices  Type of Devices: All antonio prominences offloaded,Call light within reach,Bed alarm in place,Left in bed     Restrictions  Restrictions/Precautions  Restrictions/Precautions: Weight Bearing,Fall Risk,Surgical Protocols  Required Braces or Orthoses?: Yes  Lower Extremity Weight Bearing Restrictions  Right Lower Extremity Weight Bearing: Weight Bearing As Tolerated  Required Braces or Orthoses  Right Lower Extremity Brace: Knee Immobilizer  Position Activity Restriction  Other position/activity restrictions: purewick     Subjective   Pain: Patient had pain in her right knee but she did not rate it. She said she received medicine prior to therapy. General  Chart Reviewed: Yes  Patient assessed for rehabilitation services?: Yes  Referring Practitioner: RAYSA Hood  Referral Date : 07/07/22  Diagnosis: R TKA  Follows Commands: Within Functional Limits  General Comment  Comments: RN cleared pt for PT treatment. Subjective  Subjective: Patient agreed to participate.               Cognition   Orientation  Overall Orientation Status: Within Normal Limits  Orientation Level: Oriented X4  Cognition  Overall Cognitive Status: Exceptions  Arousal/Alertness: Appropriate responses to stimuli  Following Commands: Follows one step commands consistently  Attention Span: Appears intact  Memory: Decreased recall of precautions  Safety Judgement: Decreased awareness of need for safety  Problem Solving: Decreased awareness of errors  Insights: Decreased awareness of deficits  Initiation: Requires cues for some  Sequencing: Does not require cues     Objective   Heart Rate: 92  Heart Rate Source: Monitor  BP: 135/86  BP Location: Left lower arm  Patient Position: Semi fowlers  MAP (Calculated): 102.33  Resp: 16  SpO2: 95 %  O2 Device: None (Room air)     Observation/Palpation  Posture: Fair                    Bed Mobility Training  Bed Mobility Training: Yes  Interventions: Verbal cues; Tactile cues  Supine to Sit: Stand-by assistance; Additional time  Sit to Supine: Stand-by assistance; Additional time  Scooting: Stand-by assistance; Additional time  Balance  Sitting: Intact  Standing: Impaired  Standing - Static: Fair;Occasional  Standing - Dynamic: Fair;Occasional  Transfer Training  Transfer Training: Yes  Overall Level of Assistance: Minimum assistance  Sit to Stand: Additional time; Adaptive equipment;Contact-guard assistance  Stand to Sit: Additional time; Adaptive equipment;Contact-guard assistance  Bed to Chair: Minimum assistance  Gait Training  Gait Training: Yes  Gait  Overall Level of Assistance: Minimum assistance  Interventions: Weight shifting training/pressure relief;Verbal cues  Base of Support: Widened  Speed/Francoise: Slow  Step Length: Left shortened;Right shortened  Gait Abnormalities: Decreased step clearance; Antalgic  Distance (ft): 20 Feet (15 feet. 20 feet.  1 loss of balance, min assist to correct)  Assistive Device: Walker, rolling              Balance  Posture: Fair  Sitting - Static: Fair  Sitting - Dynamic: Fair  Standing - Static: Fair  Standing - Dynamic: Poor;+  Comments: with rolling walker  Exercise Treatment: supine exs: BLE APX 15, BLE QS X 15, BLE GS X 15, LLE SLR with A x 10, LLE abd with A X 10. Seated heelsides with pillowcase X 15 ROM ~ 70* flexion  A/AROM Exercises: 1 x 5 AAROM straight leg raise RLE        AM-PAC Score     AM-PAC Inpatient Mobility without Stair Climbing Raw Score : 15 (07/10/22 1047)  AM-PAC Inpatient without Stair Climbing T-Scale Score : 43.03 (07/10/22 1047)  Mobility Inpatient CMS 0-100% Score: 47.43 (07/10/22 1047)  Mobility Inpatient without Stair CMS G-Code Modifier : CK (07/10/22 1047)       Goals  Short Term Goals  Time Frame for Short term goals: 7 days (7/14/22) unless otherwise stated  Short term goal 1: Pt will perform bed mobility with min(A)  -7/08 met; New goal: mod indep 7/10 SBA  Short term goal 2: Pt will perform transfer with RW and min(A)  -7/10 AM CGA with RW  Short term goal 3: Pt will ambulate 25 ft with RW and min(A)  -7/10 AM 20 feet with RW and min assist  Short term goal 4: Pt will perform 10 reps of BLE exercises to improve strength and mobility by 7/8/22  -7/10 initiated  Patient Goals   Patient goals : \"I want to be able short household distances (30 feet) with my walker with a little help (CGA). \" 7/10 AM goal not yet met. Education  Patient Education  Education Given To: Patient  Education Provided: Role of Therapy;Plan of Care;Transfer Training  Education Provided Comments: benefits of increased mobility/out of bed mobility, benefits of completing her exercises, use of call bell, safety with rolling walker.   Education Method: Verbal;Printed Information/Hand-outs  Barriers to Learning: None  Education Outcome: Verbalized understanding;Demonstrated understanding      Therapy Time   Individual Concurrent Group Co-treatment   Time In 0919         Time Out 0958         Minutes 39         Timed Code Treatment Minutes: [address], PT

## 2022-07-10 NOTE — PROGRESS NOTES
Perfect serve sent to Ortho team;  \"Pt okay to D/C from hospitalist and Cardiologist standpoint. Thanks. \"

## 2022-07-11 VITALS
SYSTOLIC BLOOD PRESSURE: 124 MMHG | WEIGHT: 280 LBS | RESPIRATION RATE: 17 BRPM | TEMPERATURE: 98.2 F | HEART RATE: 80 BPM | HEIGHT: 62 IN | DIASTOLIC BLOOD PRESSURE: 81 MMHG | BODY MASS INDEX: 51.53 KG/M2 | OXYGEN SATURATION: 96 %

## 2022-07-11 PROCEDURE — 6370000000 HC RX 637 (ALT 250 FOR IP): Performed by: NURSE PRACTITIONER

## 2022-07-11 PROCEDURE — 6370000000 HC RX 637 (ALT 250 FOR IP): Performed by: PHYSICIAN ASSISTANT

## 2022-07-11 PROCEDURE — 6370000000 HC RX 637 (ALT 250 FOR IP): Performed by: INTERNAL MEDICINE

## 2022-07-11 PROCEDURE — 6360000002 HC RX W HCPCS: Performed by: PHYSICIAN ASSISTANT

## 2022-07-11 PROCEDURE — 2580000003 HC RX 258: Performed by: PHYSICIAN ASSISTANT

## 2022-07-11 RX ADMIN — PANTOPRAZOLE SODIUM 40 MG: 40 TABLET, DELAYED RELEASE ORAL at 05:27

## 2022-07-11 RX ADMIN — CLONAZEPAM 0.5 MG: 0.5 TABLET ORAL at 02:18

## 2022-07-11 RX ADMIN — Medication 10 ML: at 08:26

## 2022-07-11 RX ADMIN — ACETAMINOPHEN 650 MG: 325 TABLET ORAL at 08:23

## 2022-07-11 RX ADMIN — BUSPIRONE HYDROCHLORIDE 10 MG: 5 TABLET ORAL at 08:23

## 2022-07-11 RX ADMIN — CLONAZEPAM 0.5 MG: 0.5 TABLET ORAL at 08:23

## 2022-07-11 RX ADMIN — METOPROLOL SUCCINATE 25 MG: 25 TABLET, EXTENDED RELEASE ORAL at 08:23

## 2022-07-11 RX ADMIN — ENOXAPARIN SODIUM 40 MG: 100 INJECTION SUBCUTANEOUS at 08:22

## 2022-07-11 RX ADMIN — ASPIRIN 81 MG: 81 TABLET, CHEWABLE ORAL at 08:23

## 2022-07-11 RX ADMIN — OXYCODONE 5 MG: 5 TABLET ORAL at 05:27

## 2022-07-11 RX ADMIN — TRIAMTERENE AND HYDROCHLOROTHIAZIDE 1 TABLET: 37.5; 25 TABLET ORAL at 08:23

## 2022-07-11 RX ADMIN — ACETAMINOPHEN 650 MG: 325 TABLET ORAL at 02:18

## 2022-07-11 ASSESSMENT — PAIN SCALES - GENERAL
PAINLEVEL_OUTOF10: 6
PAINLEVEL_OUTOF10: 3

## 2022-07-11 NOTE — DISCHARGE SUMMARY
Department of Orthopedic Surgery  Physician Assistant   Discharge Summary    The Kenia Betancourt is a 47 y.o. female admitted for Right TKR. Kenia Betancourt was admitted to the floor following Her recovery in the PACU.      Discharge Diagnosis  right TKR    Current Inpatient Medications    Current Facility-Administered Medications: clonazePAM (KLONOPIN) tablet 0.5 mg, 0.5 mg, Oral, 4x Daily PRN  busPIRone (BUSPAR) tablet 10 mg, 10 mg, Oral, BID  senna (SENOKOT) tablet 8.6 mg, 1 tablet, Oral, BID  aspirin chewable tablet 81 mg, 81 mg, Oral, Daily  atorvastatin (LIPITOR) tablet 40 mg, 40 mg, Oral, Nightly  metoprolol succinate (TOPROL XL) extended release tablet 25 mg, 25 mg, Oral, Daily  pantoprazole (PROTONIX) tablet 40 mg, 40 mg, Oral, QAM AC  calcium carbonate (TUMS) chewable tablet 500 mg, 500 mg, Oral, TID PRN  sodium chloride flush 0.9 % injection 5-40 mL, 5-40 mL, IntraVENous, 2 times per day  sodium chloride flush 0.9 % injection 5-40 mL, 5-40 mL, IntraVENous, PRN  0.9 % sodium chloride infusion, , IntraVENous, PRN  acetaminophen (TYLENOL) tablet 650 mg, 650 mg, Oral, Q6H  morphine (PF) injection 2 mg, 2 mg, IntraVENous, Q2H PRN **OR** morphine sulfate (PF) injection 4 mg, 4 mg, IntraVENous, Q2H PRN  oxyCODONE (ROXICODONE) immediate release tablet 5 mg, 5 mg, Oral, Q4H PRN **OR** oxyCODONE (ROXICODONE) immediate release tablet 10 mg, 10 mg, Oral, Q4H PRN  polyethylene glycol (GLYCOLAX) packet 17 g, 17 g, Oral, Daily  enoxaparin (LOVENOX) injection 40 mg, 40 mg, SubCUTAneous, Daily  HYDROmorphone (DILAUDID) injection 0.25 mg, 0.25 mg, IntraVENous, Q10 Min PRN **OR** HYDROmorphone (DILAUDID) injection 0.5 mg, 0.5 mg, IntraVENous, Q10 Min PRN  triamterene-hydroCHLOROthiazide (MAXZIDE-25) 37.5-25 MG per tablet 1 tablet, 1 tablet, Oral, Daily  venlafaxine (EFFEXOR XR) extended release capsule 300 mg, 300 mg, Oral, Nightly    Post-operatively the patients diet was advanced as tolerated and their dressing was changed on POD #1. The incision is dressing in place, clean, dry and intact with no signs of infection. The patient remained neurovascularly intact in the right lower and had intact pulses distally. Patients calf remained soft and showed no evidence of DVT. The patient was able to move their right lower extremity without any problems post-operatively. Physical therapy and occupational therapy were consulted and began working with the patient post-operatively. The patient progressed with PT/OT as would be expected and continued to improve through their stay. The patients pain was initially controlled with IV medications but we were able to transition to oral pain medications soon after arrival to the floor and their pain remained under good control through their hospital stay. From a medical standpoint the patient remained stable and continued to have the medicine team follow throughout their stay. The patient will be discharged at this time to Rehab  with their current diet restrictions and will continue to follow the precautions outlined to them by us and PT/OT. Condition on Discharge: Stable    Plan  Return visit in 2 weeks. .  Patient was instructed on the use of pain medications, the signs and symptoms of infection, and was given our number to call should they have any questions or concerns following discharge. For opioid prescriptions given at discharge the following statement is provided for compliance with [employer] rules. Patient being given increased dosage/quantity of opoid pain medication in excess of [employer] guidelines which noted a 30 MED daily of opioids due to the fact that he/she has undergone major orthopaedic surgery as outlined in rule 4731-11-13. Dosages and further duration of the pain medication will be re-evaluated at her post op visit in 2 weeks.   Patient was educated on dosing expectations and limits of prescribing as a result of the new [address] rules enacted August 31, 2017.  Please also note that this is not the initial opoid prescription issued to this patient but a continuation of medication utilized during the hospital admission as noted in the medical record. [place]ARRS report has also been utilized to screen for any abuse history or suspicious activity as outlined in [place]. All efforts have been taken to prevent abuse potential and misuse of opioid medications including education, screening, and close clinical follow up.

## 2022-07-11 NOTE — PLAN OF CARE
Problem: Discharge Planning  Goal: Discharge to home or other facility with appropriate resources  Outcome: Progressing     Problem: ABCDS Injury Assessment  Goal: Absence of physical injury  Outcome: Progressing     Problem: Pain  Goal: Verbalizes/displays adequate comfort level or baseline comfort level  Outcome: Progressing     Problem: Skin/Tissue Integrity  Goal: Absence of new skin breakdown  Description: 1. Monitor for areas of redness and/or skin breakdown  2. Assess vascular access sites hourly  3. Every 4-6 hours minimum:  Change oxygen saturation probe site  4. Every 4-6 hours:  If on nasal continuous positive airway pressure, respiratory therapy assess nares and determine need for appliance change or resting period.   Outcome: Progressing

## 2022-07-11 NOTE — PROGRESS NOTES
All IV lines removed without complications. No tele monitor in place. All meds filled and bagged per protocol. Pt discharged with all belongings via transport. Attempted to call report to the [facility] no answer at this time. Message left with answering machine.

## 2022-07-13 ENCOUNTER — TELEPHONE (OUTPATIENT)
Dept: ORTHOPEDIC SURGERY | Age: 52
End: 2022-07-13

## 2022-07-13 NOTE — TELEPHONE ENCOUNTER
Attempted to contact pt, left voicemail with purpose and call back number. Rafael Antonio  Orthopedic Nurse Navigator  Phone number: [phone]    Follow up appointments:    No future appointments.

## 2022-07-21 ENCOUNTER — TELEPHONE (OUTPATIENT)
Dept: ORTHOPEDIC SURGERY | Age: 52
End: 2022-07-21

## 2022-07-21 NOTE — TELEPHONE ENCOUNTER
Attempted to contact pt, left voicemail with purpose and call back number. Nat Meigs  Orthopedic Nurse Navigator  Phone number: [phone]    Follow up appointments:    No future appointments. Signed off from pt. Instructed pt to call RN or Surgeon's office with any issues or concerns.

## 2022-08-03 ENCOUNTER — HOSPITAL ENCOUNTER (OUTPATIENT)
Dept: PHYSICAL THERAPY | Age: 52
Setting detail: THERAPIES SERIES
Discharge: HOME | End: 2022-08-03
Payer: MEDICARE

## 2022-08-03 PROCEDURE — 97161 PT EVAL LOW COMPLEX 20 MIN: CPT

## 2022-08-03 PROCEDURE — 97110 THERAPEUTIC EXERCISES: CPT

## 2022-08-03 PROCEDURE — 97140 MANUAL THERAPY 1/> REGIONS: CPT

## 2022-08-03 PROCEDURE — 97016 VASOPNEUMATIC DEVICE THERAPY: CPT

## 2022-08-03 NOTE — FLOWSHEET NOTE
[facility] [address] [address]  Phone [phone]                                                [x] Daily Treatment Note   [] Progress Note   [] Discharge Note      Date:  8/3/2022    Patient Name:  Rajendra Chauhan        :  1970  Medical Diagnosis: s/p TKR                                                       ICD 10:  Z96.651     Treatment Diagnosis:   R knee pain. M25.561              R knee stiffness  M25.661     Onset Date:    22       Referral Date: 22                Referring Physician:    Tapan TABARES   . Dr. Neri Collins of care signed (Y/N):      Progress report will be due (10 Rx or 30 days whichever is less):  68    Recertification will be due (POC Duration  / 90 days whichever is less): 22    Visit# / total visits:   Visit # Insurance Allowable Auth Required   In Person  -  [facility] []  Yes     []  No    [facility] 0  []  Yes     []  No    Total  1       Latex Allergy:  [x]NO      []YES    Pain level: 0/10 Rest    4-5/10 stretching        3/10 before bed. Subjective:  / history of present illness:  8/3/22 states after surgery she was in the hospital for 5 days due to her pain and anxiety that caused her to have some chest pains. She had home PT for 2 visits. She has been walking without AD for about 1 week. She has climbed the flight of stairs 2x. Otherwise goes out the basement door and can avoid the stairs. Takes tylenol sometimes. She was at 80 flx with home pt. And 0 ext. Pain    Patient describes pain to be  Patient reports    0/ 10 pain at rest,     with stretching.  4-5/10        3/10 before bed. ( Usually takes tylenol)  Worsened by  - bending  Improved by - ice, tylenol  Pt. also reports that the knee/hip/ankle gives out, locks, pops, grinds.   no  Current Functional Limitations: not driving, only working 2 hrs, limited chores at home. PLOF: (I) but limped for 2 yrs. No AD. Pt. Sleep is disturbed? Yes- more due to her anxiety     Patient goal for therapy:  bend my knee as much as the L, walk for 15 min or get through [place] with a cart, returnto work for 4 hrs. Next visit:  carine step, closed chain, check on HEP, balance    Exercises:   Exercise/Equipment Resistance/Repetitions Other comments   8/3/22 explained course and role of PT     Nu step on NV      Q sets Hold 5 sec  3x5    SLR to 45' 3x5    Heel slides 3x5    Hip abd 3x5    Prone hip ext 3x5    Prone hamstring curls 3x5    Heel raise 3x5    Single leg balance 1-2 sec  bilat Goal is 7-10 sec    Stretches: gravity hang 1- 5min      3-4-5 x day                  Seated- flx Hold 25-30 sec x 5   4-5 x day                                              Therapeutic Exercise:  30 min    Group Therapy:      Home Exercise Program:      Therapeutic Activity:      Neuromuscular Re-education:      Gait:      Manual Therapy:  15 min  PROM, stretching.   -2 to 80'    Canalith Repositioning Procedure:       Modalities:  gameready x 15 min while elevated R leg.  36'  mild compression    Charges:  Timed Code Treatment Minutes:  45   Total Treatment Minutes:  75   BWC time for each procedure?:  TE TIME:  NMR TIME:  MANUAL TIME:  UNTIMED MINUTES:            [x] EVAL (LOW) 53967 (typically 20 minutes face-to-face)  [] EVAL (MOD) 70639 (typically 30 minutes face-to-face)  [] EVAL (HIGH) 89748 (typically 45 minutes face-to-face)  [] RE-EVAL     [x] CI(99356) x 2    [] IONTO  [] NMR (59883) x     [x] VASO  [x] Manual (57341) x     [] Other:  [] TA x      [] Mech Traction (63361)  [] ES(attended) (66300)     [] ES (un) (88818): Medicare Cap Total YTD:      Treatment/Activity Tolerance:    [x] Patient tolerated treatment well [] Patient limited by fatigue   [] Patient limited by pain [] Patient limited by other medical complications   [x] Other: weakness. Obesity.   balance    Prognosis: [x] Good [] Fair  [] Poor    Patient Requires Follow-up:  [x] Yes  [] No    Plan: [] Continue per plan of care [] Alter current plan (see comments)   [] Plan of care initiated [] Hold pending MD visit [] Discharge         See Progress Note: [x] Yes  [] No       Next due:         Electronically signed by:  River White.Pale  MOMT    Note: If patient does not return for scheduled/ recommended follow up visits, this note will serve as a discharge from care along with most recent update on progress.            Outpatient Physical Therapy  Progress Note      Progress Note covers period from:   8/3/22 To       Subjective:         Objective:  Observation:  Test measurements:       Functional Outcome Measure:            Assessment:  Summary:   Patient's response to treatment:      Goals:  Progress toward previous goals:      Plan:      Electronically Signed by:

## 2022-08-03 NOTE — PROGRESS NOTES
for 5 days due to her pain and anxiety that caused her to have some chest pains. She had home PT for 2 visits. She has been walking without AD for about 1 week. She has climbed the flight of stairs 2x. Otherwise goes out the basement door and can avoid the stairs. Takes tylenol sometimes. She was at [address] flx with home pt. And 0 ext. Pain    Patient describes pain to be intermit . Patient reports    0/ 10 pain at rest,     with stretching.  4-5/10     3/10 before bed. Worsened by  - bending  Improved by - ice, tylenol  Pt. also reports that the knee/hip/ankle gives out, locks, pops, grinds. no  Current Functional Limitations: not driving, only working 2 hrs, limited chores at home. PLOF: (I) but limped for 2 yrs. No AD. Pt. Sleep is disturbed? Yes- more due to her anxiety    Patient goal for therapy:  bend my knee as much as the L, walk for 15 min or get through [place] with a cart, returnto work for 4 hrs. OBJECTIVE FINDINGS    Posture  : stands with equal weight on both legs. No LBP. Pt is obese    Gait/Steps/Balance    Deviations on a level linoleum surface include - walks with antalgic gait, decr HS and TO,  Stairs: climbs 4 steps 1 at a time. With the L leading, R coming down. Balance: single stand 1-2 sec bilat.     Lumbar Spine  : neg    Range of Motion/Strength Testing    Range Tested AROM PROM MMT/Resisted Comments   *denotes pain Left Right Left Right Left Right    Hip Flexion     4/5 4-/5    Hip Extension     4-/5 3+/5    Hip Abduction     4/5 3+/5    Hip Adduction     /5 /5    Hip IR     /5 /5    Hip ER     /5 /5    Knee Flexion 98 95 112  92 4+/5 4-/5    Knee Extension 0 -5 0 -3 4+/5 3+/5    Ankle Dorsiflex     4+/5 4/5    Ankle Plantarflex     /5 /5    Ankle Inversion     4-/5 4-/5    Ankle Eversion     5/5 5/5        Girth Measurements (cm)   NT     Location Left Right Location Left Right   6 superior to   Mid Calf     3 superior to    Malleoli     Superior Patellar Pole Figure of 8     Inferior Patellar Pole   Met Heads                   Flexibility    Muscle Left Right Comment Muscle Left Right Comment   Hamstrings (90/90)    TFL/ITB (Corrina)      Gastroc    Iliopsoas (Jorge Alberto)      Soleus    Rectus Femoris      Piriformis              Palpation/Tenderness/Visual Inspection  : incision is healing well. Will start on scar massage NV  Noted mild/moderate/severe tenderness with   Noted redness -neg  Noted warmth - mild globally around the knee     Joint Mobility/Accessory Motions    Noted decreased patellar mobility with  flx and ext of the R knee         Co-morbidities/Complexities (which will affect course of rehabilitation):  []None           Arthritic conditions   []Rheumatoid arthritis (M05.9)  []Osteoarthritis (M19.91)   Cardiovascular conditions   [x]Hypertension (I10)  []Hyperlipidemia (E78.5)  []Angina pectoris (I20)  []Atherosclerosis (I70)   Musculoskeletal conditions   []Disc pathology   []Congenital spine pathologies   []Prior surgical intervention  []Osteoporosis (M81.8)  []Osteopenia (M85.8)   Endocrine conditions   []Hypothyroid (E03.9)  []Hyperthyroid Gastrointestinal conditions   []Constipation (M80.01)   Metabolic conditions   [x]Morbid obesity (E66.01)  []Diabetes type 1(E10.65) or 2 (E11.65)   []Neuropathy (G60.9)     Pulmonary conditions   []Asthma (J45)  []Coughing   []COPD (J44.9)   Psychological Disorders  [x]Anxiety (F41.9)  []Depression (F32.9)   []Other:   [x]Other: GERD         FOTO:  eval: 37     projected:   60  :      ASSESSMENT: pt presents 26 days after surgery with the expected limitations in her mobility and overall function following surgery for a TKR.       Functional Impairments:     []Noted lumbar/proximal hip/LE joint hypomobility   [x]Decreased LE functional ROM   [x]Decreased core/proximal hip strength and neuromuscular control   [x]Decreased LE functional strength   [x]Reduced balance/proprioceptive control   []other:      Functional Activity Limitations (from functional questionnaire and intake)   [x]Reduced ability to tolerate prolonged functional positions   []Reduced ability or difficulty with changes of positions or transfers between positions   [x]Reduced ability to maintain good posture and demonstrate good body mechanics with sitting, bending, and lifting   [x]Reduced ability to sleep   [x] Reduced ability or tolerance with driving and/or computer work   [x]Reduced ability to perform lifting, carrying tasks   [x]Reduced ability to squat   []Reduced ability to forward bend   [x]Reduced ability to ambulate prolonged functional periods/distances/surfaces   [x]Reduced ability to ascend/descend stairs   []Reduced ability to run, hop, cut or jump   []other:    Participation Restrictions   []Reduced participation in self care activities   []Reduced participation in home management activities   []Reduced participation in work activities   []Reduced participation in social activities. []Reduced participation in sport/recreation activities. Classification :    [x]Signs/symptoms consistent with post-surgical status including decreased ROM, strength and function. []Signs/symptoms consistent with joint sprain/strain   []Signs/symptoms consistent with patella-femoral syndrome   []Signs/symptoms consistent with knee OA/hip OA   []Signs/symptoms consistent with internal derangement of knee/Hip   []Signs/symptoms consistent with functional hip weakness/NMR control      []Signs/symptoms consistent with tendinitis/tendinosis    []signs/symptoms consistent with pathology which may benefit from Dry needling      []other:      Rehabilitation Potential:  Good for goals listed below. Strengths for achieving goals include: motivated  Limitations for achieving goals include: morbid obesity    Prognosis: [x]    Good []    Fair  []    Poor    Short Term Goals:2-3 wks  1.  Independent in HEP and progression per patient tolerance, in order to prevent re-injury. [] Progressing: [] Met: [] Not Met: [] Adjusted   2. Patient will have a decrease in pain ( 20%)to facilitate improvement in movement, function, and ADLs as indicated by Functional Deficits. [] Progressing: [] Met: [] Not Met: [] Adjusted     Long Term Goals:  1. Foto score to 45 in 4 wkeels and 55  in 8 weeks. to assist with reaching prior level of function. [] Progressing: [] Met: [] Not Met: [] Adjusted   2. Patient will demonstrate increased AROM  to 0 knee ext and 100 knee flx to allow for proper joint functioning as indicated by patients Functional Deficits. [] Progressing: [] Met: [] Not Met: [] Adjusted   3. Patient will demonstrate an increase in strength to   4 to 4+ for the R LE, single stand for 7-10 sec, climb 8 step reciprocally. to allow for proper functional mobility as indicated by patients Functional Deficits. [] Progressing: [] Met: [] Not Met: [] Adjusted   4. Patient will return to all transfers, work activities, and functional activities without increased symptoms or restriction. [] Progressing: [] Met: [] Not Met: [] Adjusted   5. Patient will have 0/10 pain with ADL's.  [] Progressing: [] Met: [] Not Met: [] Adjusted   6. Patient stated goal:  bend my knee like the L, walk through [place] with a car/15 min, return to work x 4 hrs/day.   [] Progressing: [] Met: [] Not Met: [] Adjusted     PLAN OF CARE    To see patient  2 x/week for  8 weeks for the following treatment interventions:    Therapeutic Exercise  Progressive Resistive Exercise  Modalities of Choice (Heat/Cold/US/EStim/Ionto)  Gait Training  Home Exercise Program  Manual Techniques/Mobilization  Postural Reeducation  Balance Reeducation    Physical Therapy Evaluation Complexity Justification  [x] EVAL (LOW) 11273 (typically 20 minutes face-to-face)  [] EVAL (MOD) 44580 (typically 30 minutes face-to-face)  [] EVAL (HIGH) 32048 (typically 45 minutes face-to-face)  [] RE-EVAL     Thank you for the referral of this patient.      Timed Code Treatment Minutes:   45 minutes              Total Treatment Time:  75 minutes      Mariajose Perez, PT  [id number]  [facility]

## 2022-08-05 ENCOUNTER — HOSPITAL ENCOUNTER (OUTPATIENT)
Dept: PHYSICAL THERAPY | Age: 52
Setting detail: THERAPIES SERIES
Discharge: HOME | End: 2022-08-05
Payer: MEDICARE

## 2022-08-05 NOTE — FLOWSHEET NOTE
Physical Therapy  Cancellation/No-show Note  Patient Name:  Chaim Ingram  :  1970   Date:  2022  Cancels to Date: 1  No-shows to Date: 0    For today's appointment patient:  [x]  Cancelled  []  Rescheduled appointment  []  No-show     Reason given by patient:  []  Patient ill  []  Conflicting appointment  []  No transportation    []  Conflict with work  []  No reason given  []  Other:     Comments:  not sure of the reason    Electronically signed by:  Princess Smith, [address] [place] [address]

## 2022-08-10 ENCOUNTER — HOSPITAL ENCOUNTER (OUTPATIENT)
Dept: PHYSICAL THERAPY | Age: 52
Setting detail: THERAPIES SERIES
Discharge: HOME | End: 2022-08-10
Payer: MEDICARE

## 2022-08-10 PROCEDURE — 97016 VASOPNEUMATIC DEVICE THERAPY: CPT

## 2022-08-10 PROCEDURE — 97140 MANUAL THERAPY 1/> REGIONS: CPT

## 2022-08-12 ENCOUNTER — APPOINTMENT (OUTPATIENT)
Dept: PHYSICAL THERAPY | Age: 52
End: 2022-08-12
Payer: MEDICARE

## 2022-08-15 ENCOUNTER — HOSPITAL ENCOUNTER (OUTPATIENT)
Dept: PHYSICAL THERAPY | Age: 52
Setting detail: THERAPIES SERIES
Discharge: HOME | End: 2022-08-15
Payer: MEDICARE

## 2022-08-15 NOTE — FLOWSHEET NOTE
Physical Therapy  Cancellation/No-show Note  Patient Name:  Claudette Hernández  :  1970   Date:  8/15/2022  Cancels to Date: 2  No-shows to Date: 0    For today's appointment patient:  [x]  Cancelled  []  Rescheduled appointment  []  No-show     Reason given by patient:  []  Patient ill  []  Conflicting appointment  []  No transportation    []  Conflict with work  []  No reason given  []  Other:     Comments:  not sure of the reason    Electronically signed by:  Carlos Hyde, [address] [place] [address]

## 2022-08-18 ENCOUNTER — HOSPITAL ENCOUNTER (OUTPATIENT)
Dept: PHYSICAL THERAPY | Age: 52
Setting detail: THERAPIES SERIES
Discharge: HOME | End: 2022-08-18
Payer: MEDICARE

## 2022-08-18 PROCEDURE — 97140 MANUAL THERAPY 1/> REGIONS: CPT

## 2022-08-18 PROCEDURE — 97110 THERAPEUTIC EXERCISES: CPT

## 2022-08-18 PROCEDURE — 97016 VASOPNEUMATIC DEVICE THERAPY: CPT

## 2022-08-18 NOTE — FLOWSHEET NOTE
[facility] MONY Childs [address]  Phone [phone]                                                [x] Daily Treatment Note   [] Progress Note   [] Discharge Note      Date:  2022    Patient Name:  Víctor Montanez        :  1970  Medical Diagnosis: s/p TKR                                                       ICD 10:  Z96.651     Treatment Diagnosis:   R knee pain. M25.561              R knee stiffness  M25.661     Onset Date:    22       Referral Date: 22                Referring Physician:    Valentin TABARES   . Dr. Hetal Hernández of care signed (Y/N):  yes. 22    Progress report will be due (10 Rx or 30 days whichever is less):  8/3/90    Recertification will be due (POC Duration  / 90 days whichever is less): 22    Visit# / total visits:   Visit # Insurance Allowable Auth Required   In Person   3/8-  [facility] []  Yes     []  No    Tele Health 0  []  Yes     []  No    Total 3       Latex Allergy:  [x]NO      []YES    Pain level: 0/10 Rest    4-5/10 stretching        3/10 before bed. Subjective:    22  reports she is just sore over her patella. Reports MD thinks the popping is from the plastic  8/10/22 she was running late today. Only had 30 min for session. 8/3/22 states after surgery she was in the hospital for 5 days due to her pain and anxiety that caused her to have some chest pains. She had home PT for 2 visits. She has been walking without AD for about 1 week. She has climbed the flight of stairs 2x. Otherwise goes out the basement door and can avoid the stairs. Takes tylenol sometimes. She was at 80 flx with home pt. And 0 ext. Pain    Patient describes pain to be  Patient reports    0/ 10 pain at rest,     with stretching.  4-5/10        3/10 before bed. ( Usually takes tylenol)  Worsened by  - bending  Improved by - ice, tylenol  Pt. also reports that the knee/hip/ankle gives out, locks, pops, grinds. no  Current Functional Limitations: not driving, only working 2 hrs, limited chores at home. PLOF: (I) but limped for 2 yrs. No AD. Pt. Sleep is disturbed? Yes- more due to her anxiety     Patient goal for therapy:  bend my knee as much as the L, walk for 15 min or get through [place] with a cart, return to work for 4 hrs. Next visit:   closed chain, check on HEP, balance    Exercises:   Exercise/Equipment Resistance/Repetitions Other comments   8/3/22 explained course and role of PT  X=HEP   Nu step S7 L 5 x 5 min      Q sets Hold 5 sec  3x5    SLR  3x10 1 set done in dept     Heel slides 3x10 \"    Hip abd 3x10 \"    Prone hip ext 3x10 \"    Prone hamstring curls 3x10 \"    Heel raise 3x10    Single leg balance 1-2 sec  bilat worked for 1 min Goal is 7-10 sec   Stretches: gravity hang 1- 5min      3-4-5 x day                  Seated- flx Hold 25-30 sec x 5   4-5 x day    Step up fwd 4\" 2x10    Step up bwd 4\" 2x10                                    Therapeutic Exercise: 25 min      Group Therapy:      Home Exercise Program:      Therapeutic Activity:      Neuromuscular Re-education:      Gait:      Manual Therapy:  15 min  PROM, stretching.   0 to 106'     Canalith Repositioning Procedure:       Modalities:  gameready x 15 min while elevated R leg.  36'  mild compression    Charges:  Timed Code Treatment Minutes: 40   Total Treatment Minutes:  55    BWC time for each procedure?:  TE TIME:  NMR TIME:  MANUAL TIME:  UNTIMED MINUTES:            [] EVAL (LOW) 51501 (typically 20 minutes face-to-face)  [] EVAL (MOD) 47642 (typically 30 minutes face-to-face)  [] EVAL (HIGH) 81932 (typically 45 minutes face-to-face)  [] RE-EVAL     [x] YG(51276) x 2    [] IONTO  [] NMR (44057) x     [x] VASO  [x] Manual (02045) x 1   [] Other:  [] TA x      [] Mech Traction (37081)  [] ES(attended) (46818)     [] ES (un) (14815):     Medicare Cap Total YTD: reciprocally. to allow for proper functional mobility as indicated by patients Functional Deficits. [] Progressing: [] Met: [] Not Met: [] Adjusted       4. Patient will return to all transfers, work activities, and functional activities without increased symptoms or restriction. [] Progressing: [] Met: [] Not Met: [] Adjusted       5. Patient will have 0/10 pain with ADL's.  [] Progressing: [] Met: [] Not Met: [] Adjusted       6. Patient stated goal:  bend my knee like the L, walk [place] with a car/15 min, return to work x 4 hrs/day.   [] Progressing: [] Met: [] Not Met: [] Adjusted       Progress toward previous goals:      Plan:      Electronically Signed by:

## 2022-08-22 ENCOUNTER — APPOINTMENT (OUTPATIENT)
Dept: PHYSICAL THERAPY | Age: 52
End: 2022-08-22
Payer: MEDICARE

## 2022-08-24 ENCOUNTER — HOSPITAL ENCOUNTER (OUTPATIENT)
Dept: PHYSICAL THERAPY | Age: 52
Setting detail: THERAPIES SERIES
Discharge: HOME | End: 2022-08-24
Payer: MEDICARE

## 2022-08-24 PROCEDURE — 97140 MANUAL THERAPY 1/> REGIONS: CPT

## 2022-08-24 PROCEDURE — 97016 VASOPNEUMATIC DEVICE THERAPY: CPT

## 2022-08-24 PROCEDURE — 97110 THERAPEUTIC EXERCISES: CPT

## 2022-08-24 NOTE — FLOWSHEET NOTE
[facility] MONY Childs [address]  Phone [phone]                                                [x] Daily Treatment Note   [] Progress Note   [] Discharge Note      Date:  2022    Patient Name:  Bladimir Ornelas        :  1970  Medical Diagnosis: s/p TKR                                                       ICD 10:  Z96.651     Treatment Diagnosis:   R knee pain. M25.561              R knee stiffness  M25.661     Onset Date:    22       Referral Date: 22                Referring Physician:    Shala TABARES   . Dr. Arin Chaidez of care signed (Y/N):  yes. 22    Progress report will be due (10 Rx or 30 days whichever is less):      Recertification will be due (POC Duration  / 90 days whichever is less): 22    Visit# / total visits:   Visit # Insurance Allowable Auth Required   In Person     [facility] []  Yes     []  No    [facility] 0  []  Yes     []  No    Total 4       Latex Allergy:  [x]NO      []YES    Pain level: Currently 5/10 due to coming from work      Generally   0/10 Rest    4-5/10 stretching        3/10 before bed. Subjective:    22  reports she is more sore right now due to being up on her feet a lot at work and has not been able to go home to ice. Pain is lateral superior area of knee. 22  reports she is just sore over her patella. Reports MD thinks the popping is from the plastic  8/10/22 she was running late today. Only had 30 min for session. 8/3/22 states after surgery she was in the hospital for 5 days due to her pain and anxiety that caused her to have some chest pains. She had home PT for 2 visits. She has been walking without AD for about 1 week. She has climbed the flight of stairs 2x. Otherwise goes out the basement door and can avoid the stairs. Takes tylenol sometimes. She was at 80 flx with home pt. And 0 ext. Pain    Patient describes pain to be  Patient reports    0/ 10 pain at rest,     with stretching.  4-5/10        3/10 before bed. ( Usually takes tylenol)  Worsened by  - bending  Improved by - ice, tylenol  Pt. also reports that the knee/hip/ankle gives out, locks, pops, grinds. no  Current Functional Limitations: not driving, only working 2 hrs, limited chores at home. PLOF: (I) but limped for 2 yrs. No AD. Pt. Sleep is disturbed? Yes- more due to her anxiety     Patient goal for therapy:  bend my knee as much as the L, walk for 15 min or get through [place] with a cart, return to work for 4 hrs. Next visit:   closed chain, check on HEP, balance    Exercises:   Exercise/Equipment Resistance/Repetitions Other comments   8/3/22 explained course and role of PT  X=HEP   Nu step S7 L 5 x 7 min      Q sets Hold 5 sec  3x5    SLR  3x10 1 set done in dept     Heel slides 3x10 \"    Hip abd 3x10 \"    Prone hip ext 3x10 \"    Prone hamstring curls 3x10 \"    Heel raise 3x10    Single leg balance 1-2 sec  bilat worked for 1 min Goal is 7-10 sec   Stretches: gravity hang 1- 5min      3-4-5 x day                  Seated- flx Hold 25-30 sec x 5   4-5 x day    Step up fwd 4\" 2x10    Step up bwd 4\" 2x10                                    Therapeutic Exercise:  min   10 pt requested to do ice machine and will do ex including step ups at home later due to the pain she is in.    Group Therapy:      Home Exercise Program:      Therapeutic Activity:      Neuromuscular Re-education:      Gait:      Manual Therapy:  24 min    PROM, stretching.   0 to 110'   Patella mob and STM around knee    Canalith Repositioning Procedure:       Modalities:  gameready x 15 min while elevated R leg.   34'  mild compression    Charges:  Timed Code Treatment Minutes: 34   Total Treatment Minutes:  49   BWC time for each procedure?:  TE TIME:  NMR TIME:  MANUAL TIME:  UNTIMED MINUTES:            [] EVAL (LOW) 44194 (typically 20 minutes face-to-face)  [] EVAL (MOD) 59739 (typically 30 minutes face-to-face)  [] EVAL (HIGH) 03260 (typically 45 minutes face-to-face)  [] RE-EVAL     [x] TR(12626) x 1    [] IONTO  [] NMR (74270) x     [x] VASO  [x] Manual (53418) x 1 [] Other:  [] TA x      [] Mech Traction (35371)  [] ES(attended) (43980)     [] ES (un) (14899): Medicare Cap Total YTD:      Treatment/Activity Tolerance:    pain decr to 3/10 after treatment  [x] Patient tolerated treatment well [] Patient limited by fatigue   [] Patient limited by pain [] Patient limited by other medical complications   [x] Other: weakness. Obesity. balance    Prognosis: [x] Good [] Fair  [] Poor    Patient Requires Follow-up:  [x] Yes  [] No    Plan: [x] Continue per plan of care [] Alter current plan (see comments)   [] Plan of care initiated [] Hold pending MD visit [] Discharge      See Progress Note: [] Yes  [x] No       Next due:         Electronically signed by:  Juventino Snyder, [id number]    Note: If patient does not return for scheduled/ recommended follow up visits, this note will serve as a discharge from care along with most recent update on progress. Outpatient Physical Therapy  Progress Note      Progress Note covers period from:   8/3/22 To       Subjective:         Objective:  Observation:  Test measurements:       Functional Outcome Measure:  FOTO:  eval: 37       projected:   60      Assessment:  Summary:   Patient's response to treatment:      Goals:  Short Term Goals:2-3 wks  1. Independent in HEP and progression per patient tolerance, in order to prevent re-injury. [] Progressing: [] Met: [] Not Met: [] Adjusted       2. Patient will have a decrease in pain ( 20%)to facilitate improvement in movement, function, and ADLs as indicated by Functional Deficits. [] Progressing: [] Met: [] Not Met: [] Adjusted          Long Term Goals:  1. Foto score to 45 in 4 wkeels and 55  in 8 weeks.  to assist with reaching prior level of function. [] Progressing: [] Met: [] Not Met: [] Adjusted      2. Patient will demonstrate increased AROM  to 0 knee ext and 100 knee flx to allow for proper joint functioning as indicated by patients Functional Deficits. [] Progressing: [] Met: [] Not Met: [] Adjusted       3. Patient will demonstrate an increase in strength to   4 to 4+ for the R LE, single stand for 7-10 sec, climb 8 step reciprocally. to allow for proper functional mobility as indicated by patients Functional Deficits. [] Progressing: [] Met: [] Not Met: [] Adjusted       4. Patient will return to all transfers, work activities, and functional activities without increased symptoms or restriction. [] Progressing: [] Met: [] Not Met: [] Adjusted       5. Patient will have 0/10 pain with ADL's.  [] Progressing: [] Met: [] Not Met: [] Adjusted       6. Patient stated goal:  bend my knee like the L, walk [place] with a car/15 min, return to work x 4 hrs/day.   [] Progressing: [] Met: [] Not Met: [] Adjusted       Progress toward previous goals:      Plan:      Electronically Signed by:

## 2022-08-25 ENCOUNTER — APPOINTMENT (OUTPATIENT)
Dept: PHYSICAL THERAPY | Age: 52
End: 2022-08-25
Payer: MEDICARE

## 2022-08-26 ENCOUNTER — HOSPITAL ENCOUNTER (OUTPATIENT)
Dept: PHYSICAL THERAPY | Age: 52
Setting detail: THERAPIES SERIES
Discharge: HOME | End: 2022-08-26
Payer: MEDICARE

## 2022-08-26 NOTE — FLOWSHEET NOTE
Physical Therapy  Cancellation/No-show Note  Patient Name:  Olga Hernandez  :  1970   Date:  2022  Cancels to Date: 3  No-shows to Date: 0    For today's appointment patient:  [x]  Cancelled  []  Rescheduled appointment  []  No-show     Reason given by patient:  [x]  Patient ill  []  Conflicting appointment  []  No transportation    []  Conflict with work  []  No reason given  []  Other:     Comments:       Electronically signed by:  Baxter Epley, [place], [address]

## 2022-08-29 ENCOUNTER — APPOINTMENT (OUTPATIENT)
Dept: PHYSICAL THERAPY | Age: 52
End: 2022-08-29
Payer: MEDICARE

## 2022-08-31 ENCOUNTER — HOSPITAL ENCOUNTER (OUTPATIENT)
Dept: PHYSICAL THERAPY | Age: 52
Setting detail: THERAPIES SERIES
Discharge: HOME | End: 2022-08-31
Payer: MEDICARE

## 2022-08-31 PROCEDURE — 97112 NEUROMUSCULAR REEDUCATION: CPT

## 2022-08-31 PROCEDURE — 97016 VASOPNEUMATIC DEVICE THERAPY: CPT

## 2022-08-31 PROCEDURE — 97140 MANUAL THERAPY 1/> REGIONS: CPT

## 2022-08-31 PROCEDURE — 97110 THERAPEUTIC EXERCISES: CPT

## 2022-08-31 NOTE — PROGRESS NOTES
[facility] MON[place] Childs [address]  Phone [phone]                                                [x] Daily Treatment Note   [x] Progress Note  22  [] Discharge Note      Date:  2022    Patient Name:  Sai Ochoa        :  1970  Medical Diagnosis: s/p TKR                                                       ICD 10:  Z96.651     Treatment Diagnosis:   R knee pain. M25.561              R knee stiffness  M25.661     Onset Date:    22       Referral Date: 22                Referring Physician:    Belen TABARES   . Dr. Katy Sánchez of care signed (Y/N):  yes. 22    Progress report will be due (10 Rx or 30 days whichever is less):  92    Recertification will be due (POC Duration  / 90 days whichever is less): 22    Visit# / total visits:   Visit # Insurance Allowable Auth Required   In Person     [facility] []  Yes     []  No    Dr[facility] 0  []  Yes     []  No    Total  5       Latex Allergy:  [x]NO      []YES    Pain level: Currently 5/10 due to coming from work      Generally   0/10 Rest    4-5/10 stretching         1/10 before bed. Subjective:    22 see prog note below. 22  reports she is more sore right now due to being up on her feet a lot at work and has not been able to go home to ice. Pain is lateral superior area of knee. 22  reports she is just sore over her patella. Reports MD thinks the popping is from the plastic  8/10/22 she was running late today. Only had 30 min for session. 8/3/22 states after surgery she was in the hospital for 5 days due to her pain and anxiety that caused her to have some chest pains. She had home PT for 2 visits. She has been walking without AD for about 1 week. She has climbed the flight of stairs 2x. Otherwise goes out the basement door and can avoid the stairs.   Takes tylenol sometimes. She was at 80 flx with home pt. And 0 ext. Pain    Patient describes pain to be  Patient reports    0/ 10 pain at rest,     with stretching.  4-5/10        3/10 before bed. ( Usually takes tylenol)  Worsened by  - bending  Improved by - ice, tylenol  Pt. also reports that the knee/hip/ankle gives out, locks, pops, grinds. no  Current Functional Limitations: not driving, only working 2 hrs, limited chores at home. PLOF: (I) but limped for 2 yrs. No AD. Pt. Sleep is disturbed? Yes- more due to her anxiety     Patient goal for therapy:  bend my knee as much as the L, walk for 15 min or get through [place] with a cart, return to work for 4 hrs. Next visit:   closed chain, check on HEP, balance    Exercises:  surgery 7/7/22   R knee  TKR  Exercise/Equipment Resistance/Repetitions Other comments   8/3/22 explained course and role of PT  X=HEP   Nu step S7 L 5 x 7 min      Q sets Hold 5 sec  3x5    SLR  3x10 1 set done in dept     Heel slides 3x10 \"    Hip abd 3x10 \"    Prone hip ext 3x10 \"    Prone hamstring curls 3x10 \"    Heel raise/ toe lift     1 in     2x10    Single leg balance 4 sec  bilat worked for 1 min Goal is 7-10 sec   Stretches: gravity hang 1- 5min      3-4-5 x day                  Seated- flx Hold 25-30 sec x 5   4-5 x day    Step up fwd 4\" 2x10    attempted 6 in   did 3 reps    Step up bwd 4\" 2x10    8/31/22 stepup and over 2 in   3x 5    not all at once. Short cone walk by  bar X15 steps. X  she will parctice  high march step in hallways at home. Therapeutic Exercise:  min    15   Group Therapy:      Home Exercise Program:      Therapeutic Activity:      Neuromuscular Re-education:  15 min    Gait:      Manual Therapy:   15 min    PROM, stretching.   0 to 111'   Patella mob and STM around knee    Canalith Repositioning Procedure:       Modalities:  gameready x 15 min while elevated R leg.   29'  mild compression    Charges:  Timed Code Treatment Minutes:  45   Total Treatment Minutes:   65   BWC time for each procedure?:  TE TIME:  NMR TIME:  MANUAL TIME:  UNTIMED MINUTES:            [] EVAL (LOW) 78885 (typically 20 minutes face-to-face)  [] EVAL (MOD) 55187 (typically 30 minutes face-to-face)  [] EVAL (HIGH) 20097 (typically 45 minutes face-to-face)  [] RE-EVAL     [x] JL(05044) x 1    [] IONTO  [x] NMR (28072) x     [x] VASO  [x] Manual (66727) x 1 [] Other:  [] TA x      [] Mech Traction (18470)  [] ES(attended) (66154)     [] ES (un) (86633): Medicare Cap Total YTD:      Treatment/Activity Tolerance:    pain decr to 3/10 after treatment  [x] Patient tolerated treatment well [] Patient limited by fatigue   [] Patient limited by pain [] Patient limited by other medical complications   [x] Other: weakness. Obesity. balance    Prognosis: [x] Good [] Fair  [] Poor    Patient Requires Follow-up:  [x] Yes  [] No    Plan: [x] Continue per plan of care [] Alter current plan (see comments)   [] Plan of care initiated [] Hold pending MD visit [] Discharge      See Progress Note: [x] Yes  [] No       Next due:         Electronically signed by:  Denisse Li PT, Elodie.Prima MOMT    Note: If patient does not return for scheduled/ recommended follow up visits, this note will serve as a discharge from care along with most recent update on progress. Outpatient Physical Therapy  Progress Note      Progress Note covers period from:   8/3/22 To  8/31/22      Subjective:  Pain scale has decreased. 1/10 before bed.   4-5/10 with stretching  States she is able to use a cart and walk through the [employer]. She hasn't tried [place] yet  Feels she is 50% back to normal.  She is working 3 hrs/day.   Next week she returns to her normal 4 hrs/day       Objective:  Observation:  Test measurements:  AROM:    0 ext    100 flx     PROM:  0 ext    111 flx                                     MMT:      4-/5       4-/5                                  Balance: able to single stand 4 sec. Gait: ambulating well on level surface without AD. Still has a limp for a few steps after prolonged sitting. FOTO:                              8/31/22  NT  eval: 37       projected:   60      Assessment:  gradually improving in all areas. Summary: she has been seen for 5 visits over the past 4 wks. She did cancel 3 visits. Patient's response to treatment: generally compliant     Goals:  Short Term Goals:2-3 wks  1. Independent in HEP and progression per patient tolerance, in order to prevent re-injury. [] Progressing: [x] Met: [] Not Met: [] Adjusted       2. Patient will have a decrease in pain ( 20%)to facilitate improvement in movement, function, and ADLs as indicated by Functional Deficits. [] Progressing: [x] Met: [] Not Met: [] Adjusted          Long Term Goals:  1. Foto score to 45 in 4 wkeels and 55  in 8 weeks. to assist with reaching prior level of function. [x] Progressing: [] Met: [] Not Met: [] Adjusted      2. Patient will demonstrate increased AROM  to 0 knee ext and 100 knee flx to allow for proper joint functioning as indicated by patients Functional Deficits. [] Progressing: [x] Met: [] Not Met: [] Adjusted       3. Patient will demonstrate an increase in strength to   4 to 4+ for the R LE, single stand for 7-10 sec, climb 8 step reciprocally. to allow for proper functional mobility as indicated by patients Functional Deficits. [x] Progressing: [] Met: [] Not Met: [] Adjusted       4. Patient will return to all transfers, work activities, and functional activities without increased symptoms or restriction. [x] Progressing: [] Met: [] Not Met: [] Adjusted       5. Patient will have 0/10 pain with ADL's. [x] Progressing: [] Met: [] Not Met: [] Adjusted       6. Patient stated goal:  bend my knee like the L, walk [place] with a car/15 min, return to work x 4 hrs/day.   [x] Progressing: [] Met: [] Not Met: [] Adjusted Progress toward previous goals:  STG met. Plan:she ahs 3 more visits scheduled over the next 3 weeks.   Will re eval then      Electronically Signed by:   Brochure PT [id number]

## 2022-09-07 ENCOUNTER — HOSPITAL ENCOUNTER (OUTPATIENT)
Dept: PHYSICAL THERAPY | Age: 52
Setting detail: THERAPIES SERIES
Discharge: HOME | End: 2022-09-07

## 2022-09-07 NOTE — FLOWSHEET NOTE
Physical Therapy  Cancellation/No-show Note  Patient Name:  Aleida Yu  :  1970   Date:  2022  Cancels to Date: 4  No-shows to Date: 0    For today's appointment patient:  [x]  Cancelled  []  Rescheduled appointment  []  No-show     Reason given by patient:  []  Patient ill  []  Conflicting appointment  []  No transportation    []  Conflict with work  []  No reason given  []  Other:     Comments:   leak  in her apartment and they are coming to fix it, confirmed next too appt.     Electronically signed by:  Bogdan Thbiodeaux, [id number]

## 2022-09-12 ENCOUNTER — HOSPITAL ENCOUNTER (OUTPATIENT)
Dept: PHYSICAL THERAPY | Age: 52
Setting detail: THERAPIES SERIES
Discharge: HOME | End: 2022-09-12

## 2023-05-23 ENCOUNTER — HOSPITAL ENCOUNTER (EMERGENCY)
Age: 53
Discharge: HOME | End: 2023-05-23
Payer: MEDICARE

## 2023-05-23 VITALS
HEART RATE: 89 BPM | TEMPERATURE: 99.2 F | SYSTOLIC BLOOD PRESSURE: 131 MMHG | WEIGHT: 290 LBS | OXYGEN SATURATION: 97 % | BODY MASS INDEX: 53.37 KG/M2 | DIASTOLIC BLOOD PRESSURE: 88 MMHG | HEIGHT: 62 IN | RESPIRATION RATE: 20 BRPM

## 2023-05-23 DIAGNOSIS — F41.1 ANXIETY STATE: Primary | ICD-10-CM

## 2023-05-23 PROCEDURE — 99283 EMERGENCY DEPT VISIT LOW MDM: CPT

## 2023-05-23 ASSESSMENT — PAIN SCALES - GENERAL: PAINLEVEL_OUTOF10: 0

## 2023-05-23 ASSESSMENT — PAIN - FUNCTIONAL ASSESSMENT
PAIN_FUNCTIONAL_ASSESSMENT: 0-10
PAIN_FUNCTIONAL_ASSESSMENT: NONE - DENIES PAIN

## 2023-05-23 NOTE — DISCHARGE INSTRUCTIONS
[address] [facility],4Th Floor, [address]  Ana Almanza, [address] [facility]   [address], [address] [place] [address]  [phone]  150 [facility], Michelle Raza  [phone] [address] [facility], [address], [address], [address] [facility]  [phone] 936 [facility], 759 [facility], [address]   [address], [address] [facility], [address] [place] [address], [address] [place] [address], [facility] 82  [phone] [address], 110 [facility], 10 [facility]  [phone]  Keer[facility]     [address], [facility]  [facility]  Cammy Barragan, 66 [facility], [address] [place] [address] Drive  [phone]  [address] 65 At [facility]  [phone]  [facility]

## 2023-05-23 NOTE — ED NOTES
Dc home in stable condition, mental health resources given. Pt stable, home with  family.      Karen Ty, ELISSA  05/23/23 1738

## (undated) DEVICE — HOOD, PEEL-AWAY: Brand: FLYTE

## (undated) DEVICE — 3M™ TEGADERM™ TRANSPARENT FILM DRESSING FRAME STYLE WITH BORDER, 1616, 4 IN X 4-3/4 IN (10 CM X 12 CM), 50/CT 4CT/CASE: Brand: 3M™ TEGADERM™

## (undated) DEVICE — SHEET,DRAPE,53X77,STERILE: Brand: MEDLINE

## (undated) DEVICE — SPLINT KNEE UNIV FOR LESS THAN 36IN L20IN FOAM LAM E CNTCT

## (undated) DEVICE — RIMMED SPEED PIN 45MM STERILE

## (undated) DEVICE — SMARTGOWN BREATHABLE SURGICAL GOWN: Brand: CONVERTORS

## (undated) DEVICE — Device

## (undated) DEVICE — SYSTEM SKIN CLSR 22CM DERMBND PRINEO

## (undated) DEVICE — SUTURE SURGLON SZ 1 L18IN NONABSORBABLE BLK GS 21 L37MM 1 2 8886196272

## (undated) DEVICE — STERILE POLYISOPRENE POWDER-FREE SURGICAL GLOVES: Brand: PROTEXIS

## (undated) DEVICE — 3M™ COBAN™ SELF-ADHERENT WRAP, 1586S, STERILE, 6 IN X 5 YD (15 CM X 4,5 M), 12 ROLLS/CASE: Brand: 3M™ COBAN™

## (undated) DEVICE — NEEDLE HYPO 20GA L1.5IN YEL POLYPR HUB S STL REG BVL STR

## (undated) DEVICE — BLADE SURG SAW SAG S STL AGG 905MM LEN 185MM W 127MM THCK

## (undated) DEVICE — SUTURE VCRL + SZ 2-0 L18IN ABSRB UD CT1 L36MM 1/2 CIR VCP839D

## (undated) DEVICE — 3 BONE CEMENT MIXER: Brand: MIXEVAC

## (undated) DEVICE — OPTIFOAM GENTLE SA, POSTOP, 4X12: Brand: MEDLINE

## (undated) DEVICE — PENCIL SMK EVAC ALL IN 1 DSGN ENH VISIBILITY IMPROVED AIR

## (undated) DEVICE — COVER,TABLE,HEAVY DUTY,50"X90",STRL: Brand: MEDLINE

## (undated) DEVICE — SUTURE VCRL + SZ 0 L18IN ABSRB UD L36MM CT-1 1/2 CIR VCP840D

## (undated) DEVICE — STERILE LATEX POWDER-FREE SURGICAL GLOVESWITH NITRILE COATING: Brand: PROTEXIS

## (undated) DEVICE — SOLUTION IRRIG 2000ML 0.9% SOD CHL USP UROMATIC PLAS CONT

## (undated) DEVICE — Z CONVERTED USE 2271377 BANDAGE COMPR W6INXL5YD EC E REB

## (undated) DEVICE — DRILL BIT 3.2MM (1/8'') X 128.0MM

## (undated) DEVICE — SUTURE MCRYL + SZ 4-0 L18IN ABSRB UD L19MM PS-2 3/8 CIR MCP496G

## (undated) DEVICE — HANDPIECE SET WITH HIGH FLOW TIP AND SUCTION TUBE: Brand: INTERPULSE